# Patient Record
Sex: MALE | Race: ASIAN | NOT HISPANIC OR LATINO | Employment: FULL TIME | URBAN - METROPOLITAN AREA
[De-identification: names, ages, dates, MRNs, and addresses within clinical notes are randomized per-mention and may not be internally consistent; named-entity substitution may affect disease eponyms.]

---

## 2018-08-13 ENCOUNTER — OFFICE VISIT (OUTPATIENT)
Dept: URGENT CARE | Facility: URGENT CARE | Age: 23
End: 2018-08-13
Payer: COMMERCIAL

## 2018-08-13 VITALS
HEART RATE: 105 BPM | WEIGHT: 130.6 LBS | DIASTOLIC BLOOD PRESSURE: 81 MMHG | OXYGEN SATURATION: 98 % | TEMPERATURE: 99.1 F | SYSTOLIC BLOOD PRESSURE: 121 MMHG

## 2018-08-13 DIAGNOSIS — B02.9 HERPES ZOSTER WITHOUT COMPLICATION: Primary | ICD-10-CM

## 2018-08-13 PROCEDURE — 99213 OFFICE O/P EST LOW 20 MIN: CPT | Performed by: PEDIATRICS

## 2018-08-13 RX ORDER — VALACYCLOVIR HYDROCHLORIDE 1 G/1
1000 TABLET, FILM COATED ORAL 3 TIMES DAILY
Qty: 21 TABLET | Refills: 0 | Status: SHIPPED | OUTPATIENT
Start: 2018-08-13 | End: 2021-07-21

## 2018-08-13 NOTE — PROGRESS NOTES
SUBJECTIVE:   Chintan Harley is a 23 year old male who presents to clinic today because of:    Chief Complaint   Patient presents with     Mass     Patient complains of lump on neck x 4 days. Also complains of rash on shoulders and mid chest area        HPI  Concerns: lump on neck    Lump on right neck x 4 days, not increasing in size.  Mildly tender to palpation.     RASH    Problem started: 2 days ago  Location: started on right neck, spreading to chest  Description: red, raised, burning     Itching (Pruritis): no  Recent illness or sore throat in last week: YES- subjective fevers  Therapies Tried: None  New exposures: None  Recent travel: no    Mild headache, ROS otherwise negative.  Denies eye pain or changes in vision.       ROS  Constitutional, eye, ENT, skin, respiratory, cardiac, and GI are normal except as otherwise noted.    PROBLEM LIST  There are no active problems to display for this patient.     MEDICATIONS  Current Outpatient Prescriptions   Medication Sig Dispense Refill     valACYclovir (VALTREX) 1000 mg tablet Take 1 tablet (1,000 mg) by mouth 3 times daily for 7 days 21 tablet 0      ALLERGIES  No Known Allergies    Reviewed and updated as needed this visit by clinical staff  Tobacco  Allergies  Meds  Problems         Reviewed and updated as needed this visit by Provider  Allergies  Meds  Problems       OBJECTIVE:     /81 (BP Location: Left arm, Patient Position: Chair, Cuff Size: Adult Regular)  Pulse 105  Temp 99.1  F (37.3  C) (Oral)  Wt 130 lb 9.6 oz (59.2 kg)  SpO2 98%  Normalized stature-for-age data not available for patients older than 20 years.  Normalized weight-for-age data not available for patients older than 20 years.  Normalized BMI data available only for age 0 to 20 years.  Normalized stature-for-age data not available for patients older than 20 years.    GENERAL: Active, alert, in no acute distress.  SKIN: discrete scattered clusters of erythematous  papules, some with central scabbing, on center and R chest and neck in C4/C5 distribution  HEAD: Normocephalic.  EYES:  No discharge or erythema. Normal pupils and EOM.  EARS: Normal canals. Tympanic membranes are normal; gray and translucent.  NOSE: Normal without discharge.  MOUTH/THROAT: Clear. No oral lesions. Teeth intact without obvious abnormalities.  NECK: Supple, no masses.  LYMPH NODES: posterior cervical: 1 cm mobile node on R, no fluctuance or erythema  LUNGS: Clear. No rales, rhonchi, wheezing or retractions  HEART: regular rate and rhythm, normal pulses and grade 2/6 mid-systolic vibratory murmur at the left sternal border and mid left chest (innocent vibratory murmur), resolves when supine  ABDOMEN: Soft, non-tender, not distended, no masses or hepatosplenomegaly. Bowel sounds normal.     DIAGNOSTICS: None    ASSESSMENT/PLAN:   1. Herpes zoster without complication  Education and supportive cares discussed  Contagiousness discussed- patient has an 8 month old relative at home, recommend avoiding contact with him until the sores have scabbed over  - valACYclovir (VALTREX) 1000 mg tablet; Take 1 tablet (1,000 mg) by mouth 3 times daily for 7 days  Dispense: 21 tablet; Refill: 0  -side effects discussed      FOLLOW UP: If not improving or if worsening  See patient instructions    Viola Mauro MD

## 2018-08-13 NOTE — MR AVS SNAPSHOT
After Visit Summary   8/13/2018    Chintan Harley    MRN: [id number]           Patient Information     Date Of Birth          1995        Visit Information        Provider Department      8/13/2018 5:50 PM Viola Mauro MD [facility]        Today's Diagnoses     Herpes zoster without complication    -  1      Care Instructions      Shingles  Shingles is a viral infection caused by the same virus as chicken pox. Anyone who has had chicken pox may get shingles later in life. The virus stays in the body, but remains dormant (asleep). Shingles often occurs in older persons or persons with lowered immunity. But it can affect anyone at any age.  Shingles starts as a tingling patch of skin on one side of the body. Small, painful blisters may then appear. The rash does not spread to the rest of the body.  Exposure to shingles cannot cause shingles. However, it can cause chicken pox in anyone who has not had chicken pox or has not been vaccinated. The contagious period ends when all blisters have crusted over (generally about 2 weeks after the illness begins).  After the blisters heal, the affected skin may be sensitive or painful for months (neuralgia). This often gradually goes away.  A shingles vaccine is available. This can help prevent shingles or make it less painful. It is generally recommended for adults over the age of 60 who have had chicken pox in the past, but who have never had shingles. Adults over 60 who have had neither chicken pox nor shingles can prevent both diseases with the chicken pox vaccine. Ask your healthcare provider about these vaccines.  Home care    Medicines may be prescribed to help relieve pain. Take these medicines as directed. Ask your healthcare provider or pharmacist before using over-the-counter medicines for helping treat pain and itching.    In certain cases, antiviral medicines may be prescribed to reduce pain, shorten the  illness, and prevent neuralgia. Take these medicines as directed.    Compresses made from a solution of cool water mixed with cornstarch or baking soda may help relieve pain and itching.     Gently wash skin daily with soap and water to help prevent infection.  Be certain to rinse off all of the soap, which can be irritating.    Trim fingernails and try not to scratch. Scratching the sores may leave scars.    Stay home from work or school until all blisters have formed a crust and you are no longer contagious.  Follow-up care  Follow up with your healthcare provider or as directed by our staff.  When to seek medical advice    Fever of 100.4 F (38 C) or higher, or as directed by your healthcare provider    Affected skin is on the face or neck and any of the following occur:  ? Headache  ? Eye pain  ? Changes in vision  ? Sores near the eye  ? Weakness of facial muscles    Pain, redness, or swelling of a joint    Signs of skin infection: colored drainage from the sores, warmth, increasing redness, or increasing pain  Date Last Reviewed: 9/25/2015 2000-2017 [employer]. [address] [place] [address]. All rights reserved. This information is not intended as a substitute for professional medical care. Always follow your healthcare professional's instructions.                Follow-ups after your visit        Follow-up notes from your care team     Return if symptoms worsen or fail to improve in 2-3 days.      Who to contact     If you have questions or need follow up information about today's clinic visit or your schedule please contact [facility] directly at [phone].  Normal or non-critical lab and imaging results will be communicated to you by MyChart, letter or phone within 4 business days after the clinic has received the results. If you do not hear from us within 7 days, please contact the clinic through MyChart or phone. If you have a critical or abnormal lab  "result, we will notify you by phone as soon as possible.  Submit refill requests through [employer] or call your pharmacy and they will forward the refill request to us. Please allow 3 business days for your refill to be completed.          Additional Information About Your Visit        [employer] Information     [employer] lets you send messages to your doctor, view your test results, renew your prescriptions, schedule appointments and more. To sign up, go to [email or web address] . Click on \"Log in\" on the left side of the screen, which will take you to the Welcome page. Then click on \"Sign up Now\" on the right side of the page.     You will be asked to enter the access code listed below, as well as some personal information. Please follow the directions to create your username and password.     Your access code is: [email or web address]  Expires: 2018  6:25 PM     Your access code will  in 90 days. If you need help or a new code, please call your [facility] clinic or [phone].        Care EveryWhere ID     This is your Care EveryWhere ID. This could be used by other organizations to access your [facility] medical records  [id number]        Your Vitals Were     Pulse Temperature Pulse Oximetry             105 99.1  F (37.3  C) (Oral) 98%          Blood Pressure from Last 3 Encounters:   18 121/81   12 121/69    Weight from Last 3 Encounters:   18 130 lb 9.6 oz (59.2 kg)   12 114 lb 6.4 oz (51.9 kg) (19 %)*     * Growth percentiles are based on [facility][note 3] 2-20 Years data.              Today, you had the following     No orders found for display         Today's Medication Changes          These changes are accurate as of 18  7:31 PM.  If you have any questions, ask your nurse or doctor.               Start taking these medicines.        Dose/Directions    valACYclovir 1000 mg tablet   Commonly known as:  VALTREX   Used for:  Herpes zoster without complication   Started by:  Viola Mauro " MD Anisa        Dose:  1000 mg   Take 1 tablet (1,000 mg) by mouth 3 times daily for 7 days   Quantity:  21 tablet   Refills:  0            Where to get your medicines      These medications were sent to [id number] - [place], [place] - [address] [facility] [address], [place] [address]     Phone:  [phone]     valACYclovir 1000 mg tablet                Primary Care Provider Fax #    Provider Not In System [phone]                Equal Access to Services     [facility]: Hadii aad ku hadasho Soomaali, waaxda luqadaha, qaybta kaalmada adeegyada, waxjeanne price . So [facility] [phone].    ATENCIÓN: Si habla español, tiene a enriquez disposición servicios gratuitos de asistencia lingüística. [facility] [phone].    We comply with applicable federal civil rights laws and [place] laws. We do not discriminate on the basis of race, color, national origin, age, disability, sex, sexual orientation, or gender identity.            Thank you!     Thank you for choosing [facility]  for your care. Our goal is always to provide you with excellent care. Hearing back from our patients is one way we can continue to improve our services. Please take a few minutes to complete the written survey that you may receive in the mail after your visit with us. Thank you!             Your Updated Medication List - Protect others around you: Learn how to safely use, store and throw away your medicines at www.disposemymeds.org.          This list is accurate as of 8/13/18  7:31 PM.  Always use your most recent med list.                   Brand Name Dispense Instructions for use Diagnosis    valACYclovir 1000 mg tablet    VALTREX    21 tablet    Take 1 tablet (1,000 mg) by mouth 3 times daily for 7 days    Herpes zoster without complication

## 2018-08-13 NOTE — PATIENT INSTRUCTIONS
Shingles  Shingles is a viral infection caused by the same virus as chicken pox. Anyone who has had chicken pox may get shingles later in life. The virus stays in the body, but remains dormant (asleep). Shingles often occurs in older persons or persons with lowered immunity. But it can affect anyone at any age.  Shingles starts as a tingling patch of skin on one side of the body. Small, painful blisters may then appear. The rash does not spread to the rest of the body.  Exposure to shingles cannot cause shingles. However, it can cause chicken pox in anyone who has not had chicken pox or has not been vaccinated. The contagious period ends when all blisters have crusted over (generally about 2 weeks after the illness begins).  After the blisters heal, the affected skin may be sensitive or painful for months (neuralgia). This often gradually goes away.  A shingles vaccine is available. This can help prevent shingles or make it less painful. It is generally recommended for adults over the age of 60 who have had chicken pox in the past, but who have never had shingles. Adults over 60 who have had neither chicken pox nor shingles can prevent both diseases with the chicken pox vaccine. Ask your healthcare provider about these vaccines.  Home care    Medicines may be prescribed to help relieve pain. Take these medicines as directed. Ask your healthcare provider or pharmacist before using over-the-counter medicines for helping treat pain and itching.    In certain cases, antiviral medicines may be prescribed to reduce pain, shorten the illness, and prevent neuralgia. Take these medicines as directed.    Compresses made from a solution of cool water mixed with cornstarch or baking soda may help relieve pain and itching.     Gently wash skin daily with soap and water to help prevent infection.  Be certain to rinse off all of the soap, which can be irritating.    Trim fingernails and try not to scratch. Scratching the sores  may leave scars.    Stay home from work or school until all blisters have formed a crust and you are no longer contagious.  Follow-up care  Follow up with your healthcare provider or as directed by our staff.  When to seek medical advice    Fever of 100.4 F (38 C) or higher, or as directed by your healthcare provider    Affected skin is on the face or neck and any of the following occur:  ? Headache  ? Eye pain  ? Changes in vision  ? Sores near the eye  ? Weakness of facial muscles    Pain, redness, or swelling of a joint    Signs of skin infection: colored drainage from the sores, warmth, increasing redness, or increasing pain  Date Last Reviewed: 9/25/2015 2000-2017 [employer]. [address] [place] [address], [place] [address]. All rights reserved. This information is not intended as a substitute for professional medical care. Always follow your healthcare professional's instructions.

## 2021-07-01 ASSESSMENT — ENCOUNTER SYMPTOMS
HEMATURIA: 0
CONSTIPATION: 0
COUGH: 0
DIARRHEA: 0
DYSURIA: 0
DIZZINESS: 0
JOINT SWELLING: 0
EYE PAIN: 0
HEADACHES: 0
WEAKNESS: 0
SHORTNESS OF BREATH: 0
NERVOUS/ANXIOUS: 0
ABDOMINAL PAIN: 0
PALPITATIONS: 0
CHILLS: 0
FEVER: 0
HEMATOCHEZIA: 0
NAUSEA: 0
ARTHRALGIAS: 0
FREQUENCY: 0
PARESTHESIAS: 0
HEARTBURN: 0
SORE THROAT: 0
MYALGIAS: 0

## 2021-07-05 ENCOUNTER — OFFICE VISIT (OUTPATIENT)
Dept: FAMILY MEDICINE | Facility: CLINIC | Age: 26
End: 2021-07-05
Payer: COMMERCIAL

## 2021-07-05 VITALS
TEMPERATURE: 97.6 F | HEIGHT: 67 IN | BODY MASS INDEX: 22.85 KG/M2 | HEART RATE: 63 BPM | WEIGHT: 145.6 LBS | SYSTOLIC BLOOD PRESSURE: 125 MMHG | OXYGEN SATURATION: 100 % | DIASTOLIC BLOOD PRESSURE: 79 MMHG

## 2021-07-05 DIAGNOSIS — B35.1 ONYCHOMYCOSIS: ICD-10-CM

## 2021-07-05 DIAGNOSIS — Z30.09 BIRTH CONTROL COUNSELING: ICD-10-CM

## 2021-07-05 DIAGNOSIS — Z23 NEED FOR VACCINATION: ICD-10-CM

## 2021-07-05 DIAGNOSIS — L70.9 ACNE, UNSPECIFIED ACNE TYPE: ICD-10-CM

## 2021-07-05 DIAGNOSIS — Z00.00 ROUTINE GENERAL MEDICAL EXAMINATION AT A HEALTH CARE FACILITY: Primary | ICD-10-CM

## 2021-07-05 PROCEDURE — 99213 OFFICE O/P EST LOW 20 MIN: CPT | Mod: 25 | Performed by: PHYSICIAN ASSISTANT

## 2021-07-05 PROCEDURE — 90471 IMMUNIZATION ADMIN: CPT | Performed by: PHYSICIAN ASSISTANT

## 2021-07-05 PROCEDURE — 90715 TDAP VACCINE 7 YRS/> IM: CPT | Performed by: PHYSICIAN ASSISTANT

## 2021-07-05 PROCEDURE — 90651 9VHPV VACCINE 2/3 DOSE IM: CPT | Performed by: PHYSICIAN ASSISTANT

## 2021-07-05 PROCEDURE — 99385 PREV VISIT NEW AGE 18-39: CPT | Mod: 25 | Performed by: PHYSICIAN ASSISTANT

## 2021-07-05 PROCEDURE — 90472 IMMUNIZATION ADMIN EACH ADD: CPT | Performed by: PHYSICIAN ASSISTANT

## 2021-07-05 RX ORDER — SULFAMETHOXAZOLE AND TRIMETHOPRIM 400; 80 MG/1; MG/1
1 TABLET ORAL DAILY
Qty: 30 TABLET | Refills: 0 | Status: SHIPPED | OUTPATIENT
Start: 2021-07-05 | End: 2021-08-04

## 2021-07-05 SDOH — HEALTH STABILITY: MENTAL HEALTH: HOW MANY STANDARD DRINKS CONTAINING ALCOHOL DO YOU HAVE ON A TYPICAL DAY?: 1 OR 2

## 2021-07-05 SDOH — HEALTH STABILITY: MENTAL HEALTH: HOW OFTEN DO YOU HAVE 6 OR MORE DRINKS ON ONE OCCASION?: NEVER

## 2021-07-05 SDOH — HEALTH STABILITY: MENTAL HEALTH: HOW OFTEN DO YOU HAVE A DRINK CONTAINING ALCOHOL?: 2-3 TIMES A WEEK

## 2021-07-05 ASSESSMENT — PAIN SCALES - GENERAL: PAINLEVEL: NO PAIN (0)

## 2021-07-05 ASSESSMENT — MIFFLIN-ST. JEOR: SCORE: 1604.07

## 2021-07-05 NOTE — PATIENT INSTRUCTIONS
At [facility], we strive to deliver an exceptional experience to you, every time we see you. If you receive a survey, please complete it as we do value your feedback.  If you have MyChart, you can expect to receive results automatically within 24 hours of their completion.  Your provider will send a note interpreting your results as well.   If you do not have MyChart, you should receive your results in about a week by mail.    Your care team:                            Family Medicine Internal Medicine   MD Silvestre Pickard MD Shantel Branch-Fleming, MD Srinivasa Vaka, MD Katya Belousova, PALornaC  Aure Stephens, APRN CNP    El Ca, MD Pediatrics   Thee Morales, PABELL Knapp, CNP MD Xiomara Bella APRN CNP   MD Viola Pineda MD Deborah Mielke, MD Daisy Camp, APRN [facility]      Clinic hours: Monday - Thursday 7 am-6 pm; Fridays 7 am-5 pm.   Urgent care: Monday - Friday 10 am- 8 pm; Saturday and Sunday 9 am-5 pm.    Clinic: [phone]       [facility]: Monday - Thursday 8 am - 7 pm; Friday 8 am - 6 pm  [facility]: [phone]     Use www.[email or web address].org for 24/7 diagnosis and treatment of dozens of conditions.    Patient Education     Birth Control Methods  Birth control methods are used to help prevent pregnancy. There are many different methods to choose from. Talk with your healthcare provider about which method is right for you. Be sure to ask your provider how well each one works. Also ask about the benefits, risks, and side effects of each method.   Hormones  Some birth control methods work by releasing hormones such as progestin and estrogen. These methods include hormone implants, hormone shots, the vaginal ring, the patch, and birth control pills. They all work by stopping the release of the egg from the ovary (ovulation). All of these methods work well and  can be stopped at any time.     The implant is a small device that needs to be placed in the upper arm by a trained healthcare provider. It works for up to 3 years.    Hormone injections must be repeated every 3 months.    The vaginal ring must be replaced monthly. It can be removed during the fourth week of each cycle.    The patch must be replaced weekly. It's not worn during the fourth week of each cycle.    Birth control pills must be taken every day.  Intrauterine device (IUD)  An IUD is a small, T-shaped device. It must be placed in the uterus by a trained healthcare provider. There are different types of IUDs available. They work by causing changes in the uterus that make it harder for sperm to reach the egg. Depending on the type of IUD you have, it may work for several years or longer. The IUD is a reversible birth control method. This means it can be removed at any time.   Condom  A condom is a sheath that forms a thin barrier between the penis and the vagina. It helps prevent pregnancy by keeping sperm from entering the vagina. When latex condoms are used, they have the added benefit of protecting against most STIs (sexually transmitted infections). Condoms are used each time there is sexual intercourse and should be discarded after each use. Ask your healthcare provider about the different types of condoms available. These include both the male condom and female condom.   Spermicide  Spermicides come as foams, jellies, creams, suppositories, and tablets.  They help prevent pregnancy by killing sperm. When used alone they are not that reliable. They work best when combined with other birth control methods such as diaphragms and cervical caps.   Sponge, diaphragm, and cervical cap   All of these methods help prevent pregnancy by covering the opening of the uterus (cervix). This prevents sperm from passing through.   The sponge contains spermicide. It can be bought over the counter. The sponge must be left  in place for at least 6 hours after the last time you have sex. However, it should not stay in place for more than 24 hours. Discard after use.   The diaphragm and cervical cap must be fitted and prescribed by your healthcare provider. Both are used with spermicide. The diaphragm must be left in place for at least 6 hours after sex. However, it should not stay in place for more than 24 hours. It can be washed and reused. The cervical cap must be left in place for at least 6 hours after sex. However, it should not stay in place for more than 48 hours. It can be washed and reused.   Withdrawal method  This is when the man pulls his penis out of the vagina just before ejaculation ( coming ). This lowers the amount of sperm entering the vagina. Be aware that fluids released just before ejaculation often still contain some sperm, so this method is not as reliable as certain other methods.   Rhythm method   This method is also call natural family planning or fertility awareness. It requires that you know when in your menstrual cycle you are likely to become pregnant. Then you not have sex during those days. This requires careful planning and good discipline. Your healthcare provider can explain more about how this works.   Tubal ligation and vasectomy  These are surgical methods to prevent pregnancy. Tubal ligation is an option for women. The fallopian tubes are blocked or cut (ligated). This keeps the egg from passing into the uterus or sperm from reaching the egg. Vasectomy is an option for men. The tubes that normally carry sperm to the penis are either closed or blocked. Both tubal ligation and vasectomy are permanent birth control methods. This means reversal is either not possible or unlikely to work. They are good choices for women and men who know that they don't want to have children in the future.   [employer] last reviewed this educational content on 7/1/2020 2000-2021 [employer]. All rights  reserved. This information is not intended as a substitute for professional medical care. Always follow your healthcare professional's instructions.           Patient Education     Understanding Tinea Unguium  Tinea unguium is a common type of fungal infection. It is also called onychomycosis. The fungus infects the fingernails and, more commonly, the toenails. It s more common in men, older adults, and people who have diabetes, psoriasis, peripheral vascular disease, or another health problem that weakens the immune system.   How to say it  TIN--uh [id number]  What causes tinea unguium?  Tinea unguium is caused by a fungus. Several different types of fungus can grow on the nails.  The condition is much more likely to occur on the toenails. It can spread from one nail to another. You are more likely to get tinea unguium if you:    Have another fungal infection, such as athlete s foot    Have sweaty feet    Share nail clippers with a person who has a fungal infection    Swim often    Walk barefoot in damp areas, such as locker rooms    Use communal or shared showers    Wear artificial nails  What are the symptoms of tinea unguium?  If you have an infected nail, it may become:    Brittle    Thick    Hard    Discolored, yellow to brown    Irregular in shape  The nail may also have crumbling white or colored material under it.  If left untreated, the fungus may spread to the nail bed, which is the skin under the nail. The nail may  also fall off.  How is tinea unguium treated?  Tinea unguium is diagnosed by looking at nail clippings under a microscope. Medical treatment is not needed for all people. It is helpful for those who have cellulitis on their legs or feet that comes back again and again. It is also helpful for people who have diabetes plus risk factors for cellulitis, a weak immune system, nail pain, or concerns about how their toenails look.  With proper treatment, tinea unguium may be cured. But it often  takes several months as the nail grows. It is common for tinea unguium to return even after treatment.  Treatments include:    Good hygiene. Keep feet and nails clean and dry. If the infection is on the toenails, check that your shoes fit properly.    Medicine. Over-the-counter antifungal products, such as creams, generally don't cure the infection, but they may ease symptoms. If you have a severe infection, or the infection won t go away, you may need to use a prescription-strength antifungal antibiotic on the nail, or take an antifungal antibiotic by mouth. The oral antibiotic usually needs to be taken for 3 to 4 months for the most benefit. Some of these oral medicines can have side effects, but they are usually manageable .    Surgery.  The nail may be removed. But there is a high chance the fungus will return.    Laser. A special type of laser directed at the nail itself can kill the fungus  When should I call my healthcare provider?  Call your healthcare provider right away if you have any of these:    Pain that gets worse    Symptoms that don t get better, or get worse    New symptoms  [employer] last reviewed this educational content on 11/1/2019 2000-2021 [employer]. All rights reserved. This information is not intended as a substitute for professional medical care. Always follow your healthcare professional's instructions.           Patient Education     Controlling Adult Acne     Look for water-based, oil-free skin care products. These are less likely to clog your pores.   Your acne treatment will work best if you follow your treatment plan. Be patient. Acne often takes months to improve, not days or weeks. Ask your healthcare provider when you can expect your skin to look better. If you don t see results by your goal date, call your healthcare provider. He or she may want to give you some other type of treatment.  Using skin care products and cosmetics  Besides following your treatment  plan, take care in choosing skin care products and cosmetics. The following tips may help:    Choose gentle, oil-free soaps and facial cleansers.    Don't use harsh acne scrubs, cleansers, or astringents. They can irritate your skin and make acne worse.    Ask your healthcare provider before buying over-the-counter acne treatments. Some of these, such as those with benzoyl peroxide or salicylic acid, can work. But they often have side effects, such as skin getting too dry with treatments that are too strong.    Read labels on makeup and moisturizers. Choose those that are water based and oil free. Look for the term noncomedogenic. It means that the product won t clog your pores.  Caring for your skin  The right skin care routine can help keep your skin healthy. To take good care of your skin, try these tips:    Gently wash affected skin twice a day with a mild cleanser. Using your fingertips, smooth the cleanser over your skin. Rinse your skin well. Pat it dry.    If your healthcare provider has approved any over-the-counter acne medicine, use as directed. Use it after you wash your skin. Apply the medicine to all areas where you get acne. Don t just treat acne you can see now. New blemishes may be in progress but not in view yet. Treat all the skin.    Don t squeeze or pick blemishes. Acne blemishes may heal on their own. But squeezing can cause scarring. Scars will remain after acne blemishes heal.    Using sponges, brushes, or other abrasive tools to clean your skin can irritate the skin. Don't use them.    If you use soft sponges or cloths to apply makeup, keep them clean.    Try not to touch your face.    If possible, don't wear clothing or equipment that blocks or rubs the skin with acne.  Getting good results  Learning more about acne is the first step toward controlling this condition. Acne that s being treated often gets worse at first before it improves. But with proper treatment and skin care, you can  manage your acne and feel better about your skin.  [employer] last reviewed this educational content on 7/1/2020 2000-2021 [employer]. All rights reserved. This information is not intended as a substitute for professional medical care. Always follow your healthcare professional's instructions.

## 2021-07-05 NOTE — NURSING NOTE
Prior to immunization administration, verified patients identity using patient s name and date of birth. Please see Immunization Activity for additional information.     Screening Questionnaire for Adult Immunization    Are you sick today?   No   Do you have allergies to medications, food, a vaccine component or latex?   No   Have you ever had a serious reaction after receiving a vaccination?   No   Do you have a long-term health problem with heart, lung, kidney, or metabolic disease (e.g., diabetes), asthma, a blood disorder, no spleen, complement component deficiency, a cochlear implant, or a spinal fluid leak?  Are you on long-term aspirin therapy?   No   Do you have cancer, leukemia, HIV/AIDS, or any other immune system problem?   No   Do you have a parent, brother, or sister with an immune system problem?   No   In the past 3 months, have you taken medications that affect  your immune system, such as prednisone, other steroids, or anticancer drugs; drugs for the treatment of rheumatoid arthritis, Crohn s disease, or psoriasis; or have you had radiation treatments?   No   Have you had a seizure, or a brain or other nervous system problem?   No   During the past year, have you received a transfusion of blood or blood    products, or been given immune (gamma) globulin or antiviral drug?   No   For women: Are you pregnant or is there a chance you could become       pregnant during the next month?   No   Have you received any vaccinations in the past 4 weeks?   No     Immunization questionnaire answers were all negative.        Per orders of Dr. Morales, injection of Tdap (adacel), HPV9 given by Amanda Gallo MA. Patient instructed to remain in clinic for 15 minutes afterwards, and to report any adverse reaction to me immediately.       Screening performed by Amanda Gallo MA on 7/5/2021 at 10:16 AM.

## 2021-07-05 NOTE — PROGRESS NOTES
Answers for HPI/ROS submitted by the patient on 7/1/2021   Annual Exam:  Frequency of exercise:: 2-3 days/week  Getting at least 3 servings of Calcium per day:: Yes  Diet:: Other  Taking medications regularly:: Yes  Medication side effects:: None  Bi-annual eye exam:: NO  Dental care twice a year:: Yes  Sleep apnea or symptoms of sleep apnea:: None  abdominal pain: No  Blood in stool: No  Blood in urine: No  chest pain: No  chills: No  congestion: No  constipation: No  cough: No  diarrhea: No  dizziness: No  ear pain: No  eye pain: No  nervous/anxious: No  fever: No  frequency: No  genital sores: No  headaches: No  hearing loss: No  heartburn: No  arthralgias: No  joint swelling: No  peripheral edema: No  mood changes: No  myalgias: No  nausea: No  dysuria: No  palpitations: No  Skin sensation changes: No  sore throat: No  urgency: No  rash: No  shortness of breath: No  visual disturbance: No  weakness: No  impotence: No  penile discharge: No  Additional concerns today:: Yes  Duration of exercise:: 15-30 minutes    3  SUBJECTIVE:   CC: Chintan Harley is an 26 year old male who presents for preventive health visit.       Patient has been advised of split billing requirements and indicates understanding: Yes  Healthy Habits:see above     Rt toenails getting scaly, thick    Getting bad scalp acne, seslon blue didn' thelp, also didn't improve after stopping wearing hats.      Interested in vasectomy has 2 kids.  Want to get wife of mirena as causes side effects.      Today's PHQ-2 Score:   PHQ-2 ( 1999 Pfizer) 7/5/2021 7/1/2021   Q1: Little interest or pleasure in doing things 0 0   Q2: Feeling down, depressed or hopeless 0 0   PHQ-2 Score 0 0   Q1: Little interest or pleasure in doing things - Not at all   Q2: Feeling down, depressed or hopeless - Not at all   PHQ-2 Score - 0         Do you feel safe in your environment? Yes    Have you ever done Advance Care Planning? (For example, a Health Directive, POLST,  "or a discussion with a medical provider or your loved ones about your wishes): No, advance care planning information given to patient to review.  Patient plans to discuss their wishes with loved ones or provider.      Social History     Tobacco Use     Smoking status: Never Smoker     Smokeless tobacco: Never Used   Substance Use Topics     Alcohol use: Yes     Alcohol/week: 1.0 standard drinks     Types: 1 Cans of beer per week     Frequency: 2-3 times a week     Drinks per session: 1 or 2     Binge frequency: Never     If you drink alcohol do you typically have >3 drinks per day or >7 drinks per week? no    No flowsheet data found.                      Last PSA: No results found for: PSA    Reviewed orders with patient. Reviewed health maintenance and updated orders accordingly - Yes      Reviewed and updated as needed this visit by clinical staff  Tobacco                Reviewed and updated as needed this visit by Provider                 ROS:  see above    OBJECTIVE:   /79 (BP Location: Left arm, Patient Position: Sitting, Cuff Size: Adult Regular)   Pulse 63   Temp 97.6  F (36.4  C) (Tympanic)   Ht 1.702 m (5' 7\")   Wt 66 kg (145 lb 9.6 oz)   SpO2 100%   BMI 22.80 kg/m    EXAM:  GENERAL: healthy, alert and no distress  EYES: Eyes grossly normal to inspection, PERRL and conjunctivae and sclerae normal  HENT: ear canals and TM's normal, nose and mouth without ulcers or lesions  NECK: no adenopathy, no asymmetry, masses, or scars and thyroid normal to palpation  RESP: lungs clear to auscultation - no rales, rhonchi or wheezes  CV: regular rate and rhythm, normal S1 S2, no S3 or S4, no murmur, click or rub, no peripheral edema and peripheral pulses strong  ABDOMEN: soft, nontender, no hepatosplenomegaly, no masses and bowel sounds normal  MS: no gross musculoskeletal defects noted, no edema  SKIN: rt toe nails generally ,ild thickened at the distal edges  A few pustules scatterred on scalp in various " "stages of healing  NEURO: Normal strength and tone, mentation intact and speech normal  PSYCH: mentation appears normal, affect normal/bright       ASSESSMENT/PLAN:   Chintan was seen today for physical and code ma.    Diagnoses and all orders for this visit:    Routine general medical examination at a health care facility    Onychomycosis  -     Orthopedic  Referral; Future    Acne, unspecified acne type  -     sulfamethoxazole-trimethoprim (BACTRIM) 400-80 MG tablet; Take 1 tablet by mouth daily    Birth control counseling  -     Adult Urology Referral; Future    Need for vaccination  -     TDAP VACCINE (Adacel, Boostrix)  [[id number]]  -     HUMAN PAPILLOMA VIRUS (GARDASIL 9) VACCINE [[id number]]     URO- discussed may be too young for surgery.   He will like to discuss with sx.    As difficult to tx topically on scalp, will trial a month of daily low dose bactrim for recurrent pustules vs acne    Patient has been advised of split billing requirements and indicates understanding: Yes  COUNSELING:  Reviewed preventive health counseling, as reflected in patient instructions    Estimated body mass index is 22.8 kg/m  as calculated from the following:    Height as of this encounter: 1.702 m (5' 7\").    Weight as of this encounter: 66 kg (145 lb 9.6 oz).        He reports that he has never smoked. He has never used smokeless tobacco.      Counseling Resources:  ATP IV Guidelines  Pooled Cohorts Equation Calculator  FRAX Risk Assessment  ICSI Preventive Guidelines  Dietary Guidelines for Americans, 2010  USDA's MyPlate  ASA Prophylaxis  Lung CA Screening    ALLAN Wolfe  [facility]  "

## 2021-07-21 ENCOUNTER — OFFICE VISIT (OUTPATIENT)
Dept: PODIATRY | Facility: CLINIC | Age: 26
End: 2021-07-21
Attending: PHYSICIAN ASSISTANT
Payer: COMMERCIAL

## 2021-07-21 VITALS
HEART RATE: 100 BPM | DIASTOLIC BLOOD PRESSURE: 74 MMHG | SYSTOLIC BLOOD PRESSURE: 107 MMHG | WEIGHT: 145 LBS | BODY MASS INDEX: 22.71 KG/M2

## 2021-07-21 DIAGNOSIS — L84 CALLUS: Primary | ICD-10-CM

## 2021-07-21 DIAGNOSIS — B35.1 ONYCHOMYCOSIS: ICD-10-CM

## 2021-07-21 PROCEDURE — 99203 OFFICE O/P NEW LOW 30 MIN: CPT | Performed by: PODIATRIST

## 2021-07-21 RX ORDER — CICLOPIROX 80 MG/ML
1 SOLUTION TOPICAL DAILY
Qty: 6 ML | Refills: 4 | Status: SHIPPED | OUTPATIENT
Start: 2021-07-21 | End: 2022-02-10

## 2021-07-21 NOTE — PATIENT INSTRUCTIONS
We wish you continued good healing. If you have any questions or concerns, please do not hesitate to contact us at [phone]    [facility] [employer] (secure e-mail communication and access to your chart) to send a message or to make an appointment.    Please remember to call and schedule a follow up appointment if one was recommended at your earliest convenience.     +++OF MARCH 2020+++ LOCATION AND HOURS HAVE CHANGED    PLEASE CALL CLINICS TO VERIFY DAYS AND TIMES  PODIATRY CLINIC HOURS  TELEPHONE NUMBER    Dr. Hay RILEYPAARON [facility]        Clinics:  [facility]   Tuesday 1PM-6PM  [place]  Wednesday 745AM-330PM  [place]  Thursday/Friday 745AM-230PM  [place] [facility] APPOINTMENTS  [phone]    [place] APPOINTMENTS  [phone]          If you need a medication refill, please contact us you may need lab work and/or a follow up visit prior to your refill (i.e. Antifungal medications).    If MRI needed please call Imaging at [phone] or [phone]    HOW DO I GET MY KNEE SCOOTER? Knee scooters can be picked up at ANY Medical Supply stores with your knee scooter Prescription.  OR    Bring your signed prescription to an [facility] showroom.

## 2021-07-21 NOTE — LETTER
7/21/2021         RE: Chintan Harley  [address] [place] [address]        Dear Colleague,    Thank you for referring your patient, Chintan Harley, to the [facility]. Please see a copy of my visit note below.    Patient seen today in consult from Thee Morales and complains of thick right first and fourth toenails(s) .  Has had this for several years.  It is slowly getting worse.  Has had pain in the past which is aggravated by activity and relieved by rest.  Tried over the counter medications without success.  Does not like the look of the nail and is wondering about options.  Patient also complains of painful skin lesion plantar left foot.  It is gotten worse this summer.  He is mostly wearing an expensive flip-flops.    ROS:  A 10-point review of systems was performed and is positive for that noted in the HPI and as seen below.  All other areas are negative.        No Known Allergies    Current Outpatient Medications   Medication Sig Dispense Refill     ciclopirox (PENLAC) 8 % external solution Apply 1 applicator topically daily 6 mL 4     sulfamethoxazole-trimethoprim (BACTRIM) 400-80 MG tablet Take 1 tablet by mouth daily 30 tablet 0       There is no problem list on file for this patient.      No past medical history on file.    No past surgical history on file.    Family History   Problem Relation Age of Onset     Diabetes Maternal Grandmother      Asthma No family hx of      C.A.D. No family hx of      Cardiovascular No family hx of      Hypertension No family hx of      Cerebrovascular Disease No family hx of      Breast Cancer No family hx of      Cancer - colorectal No family hx of      Prostate Cancer No family hx of        Social History     Tobacco Use     Smoking status: Never Smoker     Smokeless tobacco: Never Used   Substance Use Topics     Alcohol use: Yes     Alcohol/week: 1.0 standard drinks     Types: 1 Cans of beer per week         /74    Pulse 100   Wt 65.8 kg (145 lb)   BMI 22.71 kg/m  .      VConstitutional/ general:  Pt is in no apparent distress, appears well-nourished.  Cooperative with history and physical exam.     Psych:  The patient answered questions appropriately.  Normal affect.  Seems to have reasonable expectations, in terms of treatment.    Eyes:  Visual scanning/ tracking without deficit.    Ears:  Response to auditory stimuli is normal.  negative hearing aid devices.  Auricles in proper alignment.     Lymphatic:  Popliteal lymph nodes not enlarged.     Lungs:  Non labored breathing, non labored speech. No cough.  No audible wheezing. Even, quiet breathing.       Vascular:  Pedal pulses are palpable bilaterally for both the DP and PT arteries.  CFT < 3 sec.  No edema.  Pedal hair growth noted.     Neuro:  Alert and oriented x 3. Coordinated gait.  Light touch sensation is intact to the L4, L5, S1 distributions. No obvious deficits.  No evidence of neurological-based weakness, spasticity, or contracture in the lower extremities.    Derm: Normal texture and turgor.  No erythema, ecchymosis, or cyanosis.  No open lesions. right first and fourth toenails(s)  thickened, elongated, discolored, with subungual debris.  No erythema, edema, drainage, pain on palpation.  No signs of subungual masses or exostosis.  Small callus left subsecond metatarsal head    Musculoskeletal:    Lower extremity muscle strength is normal.  Patient is ambulatory without an assistive device or brace.  No gross deformities.  MS 5/5 all compartments.  Normal arch with weightbearing.         A/P  Onychomycosis          Callus     Discussed cause of thick nails.  Discussed all options with patient.  [facility] risks, complications, and efficacy of going on this discussed.  Instructed him on how to use this.  We gave him several refills.  Discussed cause of callus.  He will try to wear a stiffer shoe and I made suggestions.  He will use pumice stone.   RTC as needed.  Thank you for allowing me to participate in the care of this patient    Hay Rm DPM, FACFAS                      Again, thank you for allowing me to participate in the care of your patient.        Sincerely,        Hay Rm DPM

## 2021-07-22 NOTE — PROGRESS NOTES
Patient seen today in consult from Thee Morales and complains of thick right first and fourth toenails(s) .  Has had this for several years.  It is slowly getting worse.  Has had pain in the past which is aggravated by activity and relieved by rest.  Tried over the counter medications without success.  Does not like the look of the nail and is wondering about options.  Patient also complains of painful skin lesion plantar left foot.  It is gotten worse this summer.  He is mostly wearing an expensive flip-flops.    ROS:  A 10-point review of systems was performed and is positive for that noted in the HPI and as seen below.  All other areas are negative.        No Known Allergies    Current Outpatient Medications   Medication Sig Dispense Refill     ciclopirox (PENLAC) 8 % external solution Apply 1 applicator topically daily 6 mL 4     sulfamethoxazole-trimethoprim (BACTRIM) 400-80 MG tablet Take 1 tablet by mouth daily 30 tablet 0       There is no problem list on file for this patient.      No past medical history on file.    No past surgical history on file.    Family History   Problem Relation Age of Onset     Diabetes Maternal Grandmother      Asthma No family hx of      C.A.D. No family hx of      Cardiovascular No family hx of      Hypertension No family hx of      Cerebrovascular Disease No family hx of      Breast Cancer No family hx of      Cancer - colorectal No family hx of      Prostate Cancer No family hx of        Social History     Tobacco Use     Smoking status: Never Smoker     Smokeless tobacco: Never Used   Substance Use Topics     Alcohol use: Yes     Alcohol/week: 1.0 standard drinks     Types: 1 Cans of beer per week         /74   Pulse 100   Wt 65.8 kg (145 lb)   BMI 22.71 kg/m  .      VConstitutional/ general:  Pt is in no apparent distress, appears well-nourished.  Cooperative with history and physical exam.     Psych:  The patient answered questions appropriately.  Normal affect.   Seems to have reasonable expectations, in terms of treatment.    Eyes:  Visual scanning/ tracking without deficit.    Ears:  Response to auditory stimuli is normal.  negative hearing aid devices.  Auricles in proper alignment.     Lymphatic:  Popliteal lymph nodes not enlarged.     Lungs:  Non labored breathing, non labored speech. No cough.  No audible wheezing. Even, quiet breathing.       Vascular:  Pedal pulses are palpable bilaterally for both the DP and PT arteries.  CFT < 3 sec.  No edema.  Pedal hair growth noted.     Neuro:  Alert and oriented x 3. Coordinated gait.  Light touch sensation is intact to the L4, L5, S1 distributions. No obvious deficits.  No evidence of neurological-based weakness, spasticity, or contracture in the lower extremities.    Derm: Normal texture and turgor.  No erythema, ecchymosis, or cyanosis.  No open lesions. right first and fourth toenails(s)  thickened, elongated, discolored, with subungual debris.  No erythema, edema, drainage, pain on palpation.  No signs of subungual masses or exostosis.  Small callus left subsecond metatarsal head    Musculoskeletal:    Lower extremity muscle strength is normal.  Patient is ambulatory without an assistive device or brace.  No gross deformities.  MS 5/5 all compartments.  Normal arch with weightbearing.         A/P  Onychomycosis          Callus     Discussed cause of thick nails.  Discussed all options with patient.  Would like to try [facility] risks, complications, and efficacy of going on this discussed.  Instructed him on how to use this.  We gave him several refills.  Discussed cause of callus.  He will try to wear a stiffer shoe and I made suggestions.  He will use pumice stone.  RTC as needed.  Thank you for allowing me to participate in the care of this patient    Hay Rm, JOSE, FACBENJIE

## 2021-08-24 ENCOUNTER — TELEPHONE (OUTPATIENT)
Dept: UROLOGY | Facility: CLINIC | Age: 26
End: 2021-08-24

## 2021-08-24 ENCOUNTER — VIRTUAL VISIT (OUTPATIENT)
Dept: UROLOGY | Facility: CLINIC | Age: 26
End: 2021-08-24
Attending: UROLOGY
Payer: COMMERCIAL

## 2021-08-24 DIAGNOSIS — Z30.09 BIRTH CONTROL COUNSELING: ICD-10-CM

## 2021-08-24 PROCEDURE — 99202 OFFICE O/P NEW SF 15 MIN: CPT | Mod: 95 | Performed by: UROLOGY

## 2021-08-24 NOTE — LETTER
Mr.Alexander VICK Manpreetshanta  [address]  [facility] [address]          September 3, 2021    Dear ,    We have attempted to reach you by phone for a follow up discussion from your question. At your convenience, please call the [facility] at [phone] to discuss further.      Thank you,      Dr. Segura's Office

## 2021-08-24 NOTE — TELEPHONE ENCOUNTER
Received note below per Dr. Segura.    Anil Segura MD  You; [facility] [place] 2 minutes ago (3:54 PM)   KAZ Lowe,    .  If he wants to do that it would have to be before his vasectomy, but if he is interested in having more biologic children then he should just wait to schedule a vasectomy until he is sure.     Thanks,   Anil Segura M.D.          Left generic voicemail for patient to return call to clinic. When call is returned, will discuss note above.    Chrissy Braga LPN on 8/24/2021 at 3:57 PM

## 2021-08-24 NOTE — PROGRESS NOTES
VASECTOMY CONSULTATION NOTE  DATE OF VISIT: 8/24/2021  [facility]   PATIENT NAME: Chintan Harley    YOB: 1995      REASON FOR CONSULTATION: MrTheresa Harley is a 26 year old year old gentleman who is seen today requesting a vasectomy. He has 2 children - 5 year old boy and 3 year old boy - and he wishes to have a vasectomy for birth control. His wife is in agreement with this plan.     Works 2 programing jobs    PAST MEDICAL HISTORY: No past medical history on file.    PAST SURGICAL HISTORY: No past surgical history on file.    MEDICATIONS:   Current Outpatient Medications:      ciclopirox (PENLAC) 8 % external solution, Apply 1 applicator topically daily (Patient not taking: Reported on 8/24/2021), Disp: 6 mL, Rfl: 4    ALLERGIES: No Known Allergies    FAMILY HISTORY:   Family History   Problem Relation Age of Onset     Diabetes Maternal Grandmother      Asthma No family hx of      C.A.D. No family hx of      Cardiovascular No family hx of      Hypertension No family hx of      Cerebrovascular Disease No family hx of      Breast Cancer No family hx of      Cancer - colorectal No family hx of      Prostate Cancer No family hx of        SOCIAL HISTORY:   Social History     Socioeconomic History     Marital status:      Spouse name: Not on file     Number of children: Not on file     Years of education: Not on file     Highest education level: Not on file   Occupational History     Not on file   Tobacco Use     Smoking status: Never Smoker     Smokeless tobacco: Never Used   Substance and Sexual Activity     Alcohol use: Yes     Alcohol/week: 1.0 standard drinks     Types: 1 Cans of beer per week     Drug use: No     Sexual activity: Never   Other Topics Concern     Not on file   Social History Narrative     Not on file     Social Determinants of Health     Financial Resource Strain:      Difficulty of Paying Living Expenses:    Food Insecurity:      Worried About Running Out of  Food in the Last Year:      Ran Out of Food in the Last Year:    Transportation Needs:      Lack of Transportation (Medical):      Lack of Transportation (Non-Medical):    Physical Activity:      Days of Exercise per Week:      Minutes of Exercise per Session:    Stress:      Feeling of Stress :    Social Connections:      Frequency of Communication with Friends and Family:      Frequency of Social Gatherings with Friends and Family:      Attends Anabaptist Services:      Active Member of Clubs or Organizations:      Attends Club or Organization Meetings:      Marital Status:    Intimate Partner Violence:      Fear of Current or Ex-Partner:      Emotionally Abused:      Physically Abused:      Sexually Abused:        PHYSICAL EXAM  Patient is a 26 year old  male   Vitals: There were no vitals taken for this visit.  There is no height or weight on file to calculate BMI.  General Appearance Adult:   Alert, no acute distress, oriented  HENT: throat/mouth:normal, good dentition  Lungs: no respiratory distress, or pursed lip breathing  Heart: No obvious jugular venous distension present  Abdomen: non - distended  Musculoskeltal: extremities normal, no peripheral edema  Skin: no suspicious lesions or rashes  Neuro: Alert, oriented, speech and mentation normal  Psych: affect and mood normal  Gait: Normal     DIAGNOSIS: Request for sterilization    PLAN: The risks of the procedure as well as expectations for recovery and outcomes were explained in detail to him.  He was counseled on the risks for bleeding infection and pain after the procedure. We discussed the risk of post-vasectomy pain syndrome.  He was instructed to continue to use contraception until he had proven azoospermia on a semen specimen.  This would normally be collected at least 3 months after the procedure. Also discussed the rare, but possible risk of re-canalization of the vas, even after successful vasectomy with sterile semen specimen.  He was instructed  to hold all anticoagulants medications for one week prior to the procedure.  It was recommended that he have someone else drive him home after his vasectomy.  In light of these risks and expectations he would like to proceed.  We are scheduling a vasectomy in the office in the near future.    Pt. Understands:  -1/1000-1/3000 risk of future pregnancy even with perfectly done vasectomy  -vasectomy is a permanent procedure    -he may cryopreserve sperm if he wishes   -1-5% risk of post-vasectomy pain syndrome   -1-5% risk of complication, primarily infection or bleeding  - he needs to have a semen sample that shows no sperm before getting approval for unprotected intercourse.      Thank you for the kind consultation.    Time spent: 10 minutes of which >50% was spent counseling.    Anil Segura MD   Urology  [facility]  Clinic Phone [phone]      Chintan Harley  who is being evaluated via a billable video visit.      How would you like to obtain your AVS? MyChart  If the video visit is dropped, the invitation should be resent by: Text to cell phone: [phone]  Will anyone else be joining your video visit? No    Video-Visit Details    Type of service:  Video Visit    Video Start Time: 8:15 AM    Video End Time:8:25 AM    Originating Location (pt. Location): Home    Distant Location (provider location):  [facility]     Platform used for Video Visit: Easiest Credit Card To Get Approved For

## 2021-08-24 NOTE — TELEPHONE ENCOUNTER
[facility] Call Center    Phone Message    May a detailed message be left on voicemail: yes     Reason for Call: The patient called and schedule his vascectomy for 11/16/21. He also had questions regarding cryopreservation. Does that happen before of at the time of vasectomy? He can be reached at the phone number on file or through [employer]. Please advise. Thank you.    Action Taken: Message routed to:  Adult Clinics: Urology p [phone]    Travel Screening: Not Applicable

## 2021-08-31 NOTE — TELEPHONE ENCOUNTER
Attempted to reach patient by phone, but no answer. Left generic message with request to return call to clinic. When patient returns call, will discuss note and recommendations below from Dr. Segura.    Suzy Lynn RN, BSN

## 2021-09-03 NOTE — TELEPHONE ENCOUNTER
No follow up call received from patient. Letter written and sent to patient's home address on file.    Suzy Lynn RN, BSN

## 2021-09-09 ENCOUNTER — NURSE TRIAGE (OUTPATIENT)
Dept: NURSING | Facility: CLINIC | Age: 26
End: 2021-09-09

## 2021-09-09 NOTE — TELEPHONE ENCOUNTER
RN Triage:    Chintan is returning a call from the clinic regarding vasectomy and cryopreservation.  Writer read note from Dr. Segura dated 8/24/21 regarding above to Chintan.  He had no other questions.    Kenyetta López RN 09/09/21 5:34 PM  [facility] Nurse Advisor

## 2021-09-19 ENCOUNTER — MYC MEDICAL ADVICE (OUTPATIENT)
Dept: FAMILY MEDICINE | Facility: CLINIC | Age: 26
End: 2021-09-19

## 2021-09-19 ENCOUNTER — MYC MEDICAL ADVICE (OUTPATIENT)
Dept: UROLOGY | Facility: CLINIC | Age: 26
End: 2021-09-19

## 2021-09-26 ENCOUNTER — HEALTH MAINTENANCE LETTER (OUTPATIENT)
Age: 26
End: 2021-09-26

## 2021-11-16 ENCOUNTER — OFFICE VISIT (OUTPATIENT)
Dept: UROLOGY | Facility: CLINIC | Age: 26
End: 2021-11-16
Payer: COMMERCIAL

## 2021-11-16 VITALS
DIASTOLIC BLOOD PRESSURE: 80 MMHG | RESPIRATION RATE: 18 BRPM | HEART RATE: 63 BPM | OXYGEN SATURATION: 100 % | SYSTOLIC BLOOD PRESSURE: 127 MMHG

## 2021-11-16 DIAGNOSIS — Z30.2 ENCOUNTER FOR STERILIZATION: Primary | ICD-10-CM

## 2021-11-16 PROCEDURE — 55250 REMOVAL OF SPERM DUCT(S): CPT | Performed by: UROLOGY

## 2021-11-16 ASSESSMENT — PAIN SCALES - GENERAL: PAINLEVEL: NO PAIN (0)

## 2021-11-16 NOTE — PROGRESS NOTES
OFF[facility]PERAT[facility]     DATE: 11/16/21  PATIENT: Chintan Harley    YOB: 1995    Chintan Harley is a 26 year old male.  He has 2 children and he wishes a vasectomy for birth control.  He has read the brochure and he has shaved himself.  I reviewed the vasectomy procedure with him explaining that it would be done with a local anesthetic given just in the location where the vasectomy would be done.  It would be done through incisions with the removal of segments of the vasa, cauterization of the ends, and burying the ends separate with sutures.      Pt. Understands:  -1/1000-1/3000 risk of future pregnancy even with perfectly done vasectomy  -vasectomy is a permanent procedure    -he may cryopreserve sperm if he wishes   -1-5% risk of post-vasectomy pain syndrome   -1-5% risk of complication, primarily infection or bleeding  - he needs to have a semen sample that shows no sperm before getting approval for unprotected intercourse.      Complications such as bleeding, infection, and damage to other tissues in the area were discussed. I recommended that an ice bag be placed on the scrotum off and on tonight to help reduce pain and swelling.      He was reminded that he was not sterile immediately after the vasectomy that it would take at least 20 ejaculations to empty the vas of any remaining sperm.  He was not to provide a semen sample until after the 20th ejaculation and not before 12 weeks after the vas. He was  to fulfill both of those requirements.   He understands it is his responsibility to find out the results of the vas before proceeding with intercourse without birth control protection.  Other items discussed were activity afterwards, returning to work, voluntary physical activity,  resuming sexual activity, clothing to wear, bathing, and care of the vas site and expected changes in the site as healing progresses.  After signing the permit, bilateral vasectomy  was done as described below.     ANESTHESIA: Local    DETAILS OF PROCEDURE: The risks of the procedure were explained in detail to the patient and informed consent was obtained. The patient was placed supine on the procedure table and the penis and scrotum were prepped and draped in the standard sterile fashion. The right vas deferens was isolated and brought up to the skin. 1% lidocaine local anesthesia was used to infiltrate the skin and the spermatic cord. A small incision was created and adventitial tissues were swept away from the vas. A 1 cm segment of the vas was excised and sent for pathology. The proximal and distal lumina of the vas were cauterized and then each segment was tied off in a knuckling-fashion with a 3-0 vicryl suture. Hemostasis was ensured and the segments were released back into the scrotum. Meticulous hemostasis was achieved. Next the left vas was brought to the skin and a vasectomy was performed in the similar fashion. At the end of the procedure a single 3-0 chromic suture was placed in the skin.     COMPLICATIONS: None    TAKE HOME MEDICATIONS: Tylenol every 6 hours, PRN    DISMISSAL INSTRUCTIONS:  - Ice pack to scrotum 15 to 20 minutes each hour awake for 36 to 40 hours.  - No strenuous activity or ejaculation for 14 days.  - No unprotected sexual activity until proven azoospermia on semen samples at 3 months.  - Referred to patient handout for normal postop expectations and indications to contact nurse or physician.    M.D.: Anil Segura MD

## 2021-11-16 NOTE — NURSING NOTE
Chintan Harley's goals for this visit include:   Chief Complaint   Patient presents with     Sterilization       He requests these members of his care team be copied on today's visit information: NONE    PCP: No Ref-Primary, Physician    Referring Provider:  No referring provider defined for this encounter.    /80   Pulse 63   Resp 18   SpO2 100%     Do you need any medication refills at today's visit? None    Suzy Lynn RN, BSN

## 2021-11-16 NOTE — PATIENT INSTRUCTIONS
Vasectomy Post-Op Care Instructions     You may go home after the procedure is completed. There may be some pain in your groin for 3 or 4 days after the operation. Some blood or yellow liquid may ooze from the cuts on the outside. The area around the cuts may swell and bruise. The sutures will dissolve on their own and do not require removal by the physician.    The first 48 hours after the procedure are crucial to healing. Generally, you may feel very good the day after the procedure, but that does not mean it is time to go back to normal activities. Resuming normal activities too soon is likely to cause internal bleeding and lots of pain.      Your provider may advise the following ways to care for yourself after the procedure:    Put an ice bag or package of frozen peas covered by a thin towel over the scrotum after the procedure. Leave the ice on the area for 30 minutes and then take it off for 30 minutes. Do this off and on for at least 24 hours.      Avoid all heavy lifting (greater than 10 pounds) for at least one week.    Wear a jockstrap or tight-fitting underwear for approximately one week to support the scrotum (testicles) and help reduce discomfort.    Take a pain reliever, such as Acetaminophen (Tylenol) or Ibuprofen (Advil), for any pain after the procedure. Your provider may prescribe a stronger pain medicine if it is needed.    You should be able to return to work in 48 hours, but strenuous activity should be avoided for two weeks.    Do not submerge the incision for 1 week following the procedure.    Ejaculation should be avoided for one week to allow the area to heal.    Follow-Up    You will need to have a negative post vasectomy analyses (no sperm seen) before you can discontinue birth control.    Semen Analysis   Schedule the post-vasectomy semen analysis three months after your vasectomy. You will need to ejaculate at least 20 times between your surgery and the first sample. Clinic staff will  contact your regarding your results via phone.    You will be given a container after the procedure. Collect the specimen at home by masturbation only (no lubrication, powders, saliva, or intercourse can be used) and bring it to the laboratory. You must have an appointment to drop off your specimen. The specimen needs to be delivered to the lab within 30-45 minutes.    Call [phone] with questions. For concerns or questions after hours or on weekends, please page the Urology Resident on-call: [phone].

## 2021-11-23 ENCOUNTER — MYC MEDICAL ADVICE (OUTPATIENT)
Dept: UROLOGY | Facility: CLINIC | Age: 26
End: 2021-11-23
Payer: COMMERCIAL

## 2021-11-23 DIAGNOSIS — Z30.2 ENCOUNTER FOR STERILIZATION: Primary | ICD-10-CM

## 2021-11-23 DIAGNOSIS — Z98.52 STATUS POST VASECTOMY: ICD-10-CM

## 2021-11-23 RX ORDER — CEPHALEXIN 500 MG/1
500 CAPSULE ORAL 2 TIMES DAILY
Qty: 10 CAPSULE | Refills: 0 | Status: SHIPPED | OUTPATIENT
Start: 2021-11-23 | End: 2021-11-28

## 2021-11-23 NOTE — CONFIDENTIAL NOTE
Discussed note and my chart message with Dr. Segura who recommends keflex 500mg po BID for 5 days.     Called and spoke to patient who is aware of the above information. Patient verbalized understanding. Keflex ordered per Dr. Segura. Prescription sent to [facility] in [place] per patient request. Informed patient to call or send a my chart message with any questions.    Suzy Lynn RN, BSN

## 2021-11-23 NOTE — CONFIDENTIAL NOTE
Called and spoke to patient regarding my chart message.  Patient reports right testicular pain rated 3/10. Per patient, he had a small (pencil eraser-sized) amount of dark yellow/white drainage from the right incision. Patient denies fever or temperature greater than 100.4 and chills. Per patient, he continues to use scrotal support and has stopped applying ice to the area. Patient reports that he has been following the lifting and activity restrictions.     Informed patient that this will be discussed with Dr. Segura. Patient aware that he will be contacted with additional recommendations. Patient was comfortable with plan.    Suzy Lynn RN, BSN

## 2022-02-04 ENCOUNTER — TELEPHONE (OUTPATIENT)
Dept: DERMATOLOGY | Facility: CLINIC | Age: 27
End: 2022-02-04
Payer: COMMERCIAL

## 2022-02-04 NOTE — TELEPHONE ENCOUNTER
Dr. Skelton request a Telephone visit for scalp rash/lesion be made for this patient next Thursday. Called patient and assisted in scheduling a telephone new appt. Patient is aware to send pictures via [employer] 24 hours prior to appointment.    Chrissy Braga LPN on 2/4/2022 at 1:12 PM

## 2022-02-09 NOTE — PATIENT INSTRUCTIONS
[facility] Dermatology Visit    Thank you for allowing us to participate in your care. Your findings, instructions and follow-up plan are as follows:     After 2 weeks Start lidex solution once nightly for a total of 2 weeks    When should I call my doctor?    If you are worsening or not improving, please, contact us or seek urgent care as noted below.     Who should I call with questions (adults)?    [facility] (adult and pediatric): [phone]    [facility] (adult): [phone]    For urgent needs outside of business hours call the [facility] at [phone] and ask for the dermatology resident on call    If this is a medical emergency and you are unable to reach an ER, Call [phone]    Who should I call with questions (pediatric)?  [facility]- Pediatric Dermatology  Dr. Jerri Frances, Dr. Ne Simmons, Dr. Kenyetta Aguila, Meri [facility] [place] [place]  Dr. Miracle Avery, Dr. Liss Little & Dr. Hussain Guzman  Non Urgent  Nurse Triage Line; [phone]- Manuela and Carmen RN Care Coordinators   Vani (/Complex ) [phone]    If you need a prescription refill, please contact your pharmacy. Refills are approved or denied by our physicians during normal business hours, Monday through Fridays  Per office policy, refills will not be granted if you have not been seen within the past year (or sooner depending on your child's condition).    Scheduling Information:  Pediatric Appointment Scheduling and Call Center [phone]  Radiology Scheduling- [phone]  Sedation Unit Scheduling- [phone]  [place] Scheduling- General [phone]; Pediatric Dermatology [phone]  Main  Services: [phone]  Nigerian: [phone]  Citizen of [place]: [phone]  Hmong/Sinhala/Eliseo: [phone]  Preadmission Nursing Department Fax Number: [phone] (fax all pre-operative  paperwork to this number)    For urgent matters arising during evenings, weekends, or holidays that cannot wait for normal business hours please call [phone] and ask for the dermatology resident on call to be paged.

## 2022-02-10 ENCOUNTER — VIRTUAL VISIT (OUTPATIENT)
Dept: DERMATOLOGY | Facility: CLINIC | Age: 27
End: 2022-02-10

## 2022-02-10 DIAGNOSIS — L70.0 ACNE VULGARIS: Primary | ICD-10-CM

## 2022-02-10 DIAGNOSIS — L30.9 DERMATITIS: ICD-10-CM

## 2022-02-10 PROCEDURE — 99203 OFFICE O/P NEW LOW 30 MIN: CPT | Mod: 95 | Performed by: DERMATOLOGY

## 2022-02-10 RX ORDER — FLUOCINONIDE TOPICAL SOLUTION USP, 0.05% 0.5 MG/ML
SOLUTION TOPICAL
Qty: 60 ML | Refills: 0 | Status: SHIPPED | OUTPATIENT
Start: 2022-02-10 | End: 2022-09-26

## 2022-02-10 RX ORDER — DOXYCYCLINE 100 MG/1
100 CAPSULE ORAL 2 TIMES DAILY
Qty: 180 CAPSULE | Refills: 0 | Status: SHIPPED | OUTPATIENT
Start: 2022-02-10 | End: 2022-04-01

## 2022-02-10 NOTE — PROGRESS NOTES
Teledermatology Nurse Call Patients:     Are you in the [facility] at the time of the encounter? Yes    Today's visit will be billed to you and your insurance.    A teledermatology visit is not as thorough as an in-person visit and the quality of the photograph sent may not be of the same quality as that taken by the dermatology clinic.    Patient summary of issue: acne like bumps on scalp  Location of problem on body: scalp  How long has area/symptoms been present: close to 1 year   What makes it better?: unknown prescription from GP made it temporarily better  What makes it worse?: wearing winter hat for extended periods of time  Other symptoms include the following: itch, but not painful   Which medications have been tried, for how long, and did they make it better or worse (ex. Triamcinolone used twice daily for 2 weeks, not improved): selsun blue helped relieve the discomfort, has also tried tea tree oil, and unknown prescription from GP that was temporarily effective.   The patient has not seen a dermatologist.   The patient has no history of skin cancer.   ROS: The patient is generally feeling well.    Kristin Skelton MD on 2/10/2022 at 2:46 PM    [facility] Dermatology Note  Encounter Date: Feb 10, 2022  Telephone ([phone]). Location of teledermatologist: [facility].   Start time: 3:56pm. End time: 4:06pm.    Dermatology Problem List:  1. Scalp lesions  - has done well on bactrim with PCP     ____________________________________________    Assessment & Plan:     # Acne vulgaris on face with scalp folliculitis and possibly dermatitis, consider acne keloidalis nuchae   - wait 2 weeks then start lidex solution, if this worsens it stop it and call the clinic  - Start doxycycline 100 mg BID. Recommend that patient try tocalcium-containing products around the time of taking the medication.  Instructed to take with a full glass of fluid and food, not lying  down.  Side effects of photosensitivity, headaches, GI upset discussed.     - avoid shirts with collars  - try not to trim hair for the next 4 weeks  - wash hair nightly  -use head and shoulders shampoo and selsun blue    # lymph node, recommend see PCP. I cannot see this today.   -see PCP      Procedures Performed:    None.    Follow-up: in 4 weeks derm and earlier if worsening    Staff and Scribe:     Scribe Disclosure:   I, Sidra Lam, am serving as a scribe to document services personally performed by this physician, Dr. Kristin Skelton, based on data collection and the provider's statements to me.     Provider Disclosure:   The documentation recorded by the scribe accurately reflects the services I personally performed and the decisions made by me.    Kristin Skelton MD    Department of Dermatology  [facility]: Phone: [phone], Fax:[phone]  [facility]: Phone: [phone], Fax: [phone]      ____________________________________________    CC: Derm Problem (acne like bumps on scalp )    HPI:  Mr. Chintan Harley is a(n) 26 year old male who presents today as a new patient for  Acne bumps on scalp. This patient was self referred to me and self requested an appt with me. Has been present for close to 1 year. Has unknown script from GP making it better. Wearing hat for winter. Has itch but not painful. Selsun Blue helps    Never seen derm  No hx of skin cancer     Not shaving, using elan      Possibly pill was bactrim      Shingles all over back in the past with associate lymph node >1 year ago    Patient is otherwise feeling well, without additional skin concerns.    Labs Reviewed:  N/A    Physical Exam:  Vitals: There were no vitals taken for this visit.  SKIN: Teledermatology photos were reviewed; image quality and interpretability: acceptable. Image date: 2/9/22.  -  photos on along the hairline are on the scalp  - No other lesions of concern on areas examined.     Medications:  Current Outpatient Medications   Medication     ciclopirox (PENLAC) 8 % external solution     No current facility-administered medications for this visit.      Past Medical/Surgical History:   There is no problem list on file for this patient.    No past medical history on file.    CC No referring provider defined for this encounter. on close of this encounter.

## 2022-02-10 NOTE — LETTER
2/10/2022         RE: Chintan Harley  [address] [place] [address]        Dear Colleague,    Thank you for referring your patient, Chintan Harley, to the [facility]. Please see a copy of my visit note below.    Teledermatology Nurse Call Patients:     Are you in the [facility] at the time of the encounter? Yes    Today's visit will be billed to you and your insurance.    A teledermatology visit is not as thorough as an in-person visit and the quality of the photograph sent may not be of the same quality as that taken by the dermatology clinic.    Patient summary of issue: acne like bumps on scalp  Location of problem on body: scalp  How long has area/symptoms been present: close to 1 year   What makes it better?: unknown prescription from GP made it temporarily better  What makes it worse?: wearing winter hat for extended periods of time  Other symptoms include the following: itch, but not painful   Which medications have been tried, for how long, and did they make it better or worse (ex. Triamcinolone used twice daily for 2 weeks, not improved): selsun blue helped relieve the discomfort, has also tried tea tree oil, and unknown prescription from GP that was temporarily effective.   The patient has not seen a dermatologist.   The patient has no history of skin cancer.   ROS: The patient is generally feeling well.    Kristin Skelton MD on 2/10/2022 at 2:46 PM    [facility] Dermatology Note  Encounter Date: Feb 10, 2022  Telephone ([phone]). Location of teledermatologist: [facility].   Start time: 3:56pm. End time: 4:06pm.    Dermatology Problem List:  1. Scalp lesions  - has done well on bactrim with PCP     ____________________________________________    Assessment & Plan:     # Acne vulgaris on face with scalp folliculitis and possibly dermatitis, consider acne keloidalis nuchae   - wait 2 weeks then start  lidex solution, if this worsens it stop it and call the clinic  - Start doxycycline 100 mg BID. Recommend that patient try tocalcium-containing products around the time of taking the medication.  Instructed to take with a full glass of fluid and food, not lying down.  Side effects of photosensitivity, headaches, GI upset discussed.     - avoid shirts with collars  - try not to trim hair for the next 4 weeks  - wash hair nightly  -use head and shoulders shampoo and selsun blue    # lymph node, recommend see PCP. I cannot see this today.   -see PCP      Procedures Performed:    None.    Follow-up: in 4 weeks derm and earlier if worsening    Staff and Scribe:     Scribe Disclosure:   I, Sidra Lam, am serving as a scribe to document services personally performed by this physician, Dr. Kristin Skelton, based on data collection and the provider's statements to me.     Provider Disclosure:   The documentation recorded by the scribe accurately reflects the services I personally performed and the decisions made by me.    Kristin Skelton MD    Department of Dermatology  [facility]: Phone: [phone], Fax:[phone]  [facility]: Phone: [phone], Fax: [phone]      ____________________________________________    CC: Derm Problem (acne like bumps on scalp )    HPI:  Mr. Chintan Harley is a(n) 26 year old male who presents today as a new patient for  Acne bumps on scalp. This patient was self referred to me and self requested an appt with me. Has been present for close to 1 year. Has unknown script from GP making it better. Wearing hat for winter. Has itch but not painful. Selsun Blue helps    Never seen derm  No hx of skin cancer     Not shaving, using elan      Possibly pill was bactrim      Shingles all over back in the past with associate lymph node >1 year ago    Patient is  otherwise feeling well, without additional skin concerns.    Labs Reviewed:  N/A    Physical Exam:  Vitals: There were no vitals taken for this visit.  SKIN: Teledermatology photos were reviewed; image quality and interpretability: acceptable. Image date: 2/9/22.  - photos on along the hairline are on the scalp  - No other lesions of concern on areas examined.     Medications:  Current Outpatient Medications   Medication     ciclopirox (PENLAC) 8 % external solution     No current facility-administered medications for this visit.      Past Medical/Surgical History:   There is no problem list on file for this patient.    No past medical history on file.    CC No referring provider defined for this encounter. on close of this encounter.      Again, thank you for allowing me to participate in the care of your patient.        Sincerely,        Kristin Skelton MD

## 2022-03-07 ENCOUNTER — VIRTUAL VISIT (OUTPATIENT)
Dept: DERMATOLOGY | Facility: CLINIC | Age: 27
End: 2022-03-07
Payer: COMMERCIAL

## 2022-03-07 DIAGNOSIS — Z87.2 HISTORY OF FOLLICULITIS: Primary | ICD-10-CM

## 2022-03-07 PROCEDURE — 99213 OFFICE O/P EST LOW 20 MIN: CPT | Mod: 95 | Performed by: DERMATOLOGY

## 2022-03-07 NOTE — NURSING NOTE
Teledermatology Nurse Call Patients:     Are you in the [facility] at the time of the encounter? yes    Today's visit will be billed to you and your insurance.    A teledermatology visit is not as thorough as an in-person visit and the quality of the photograph sent may not be of the same quality as that taken by the dermatology clinic.      Medications and allergies reviewed with patient.     DIMAS Fitzgerald

## 2022-03-07 NOTE — PROGRESS NOTES
[facility] Dermatology Note  Encounter Date: Mar 7, 2022  Telephone ([phone]). Location of teledermatologist: [facility]. Start time: 1:53pm. End time: 2pm.    Dermatology Problem List:  1. Scalp lesions  - has done well on bactrim with PCP     ____________________________________________     Assessment & Plan:      # Acne vulgaris on face with scalp folliculitis and possibly dermatitis- now resolved   - wait 2 weeks then start lidex solution, if this worsens it stop it and call the clinic  -doxycyline until April 1, stop early  - stop solution lidex   -acne on forehead is better, he can consider differin, declines benzaclin      # lymph node- pt reports lesions is resolved.       # general dry skin care - pt reports no rash  -vanicream after shower from the neck      Procedures Performed:    None    Follow-up:  As needed    Staff:     Kristin Skelton MD    Department of Dermatology  [facility]: Phone: [phone], Fax:[phone]  [facility]: Phone: [phone], Fax: [phone]      ____________________________________________    CC: Acne    HPI:  Mr. Chintan Harley is a(n) 26 year old male who presents today as a return patient for scalp. He feels his scalp is clear.   The patient denies headache, vision, GI upset or sun burn.     Did not start solution    Pt reports other skin issue. Shins, forearm, upper arms. He feels it is dry.       Patient is otherwise feeling well, without additional skin concerns.    Labs Reviewed:  Na    Physical Exam:  Vitals: There were no vitals taken for this visit.  SKIN: Teledermatology photos were reviewed; image quality and interpretability: acceptable. Image date: today.  -no acne, no erythema  - No other lesions of concern on areas examined.     Medications:  Current Outpatient Medications    Medication     doxycycline monohydrate (MONODOX) 100 MG capsule     fluocinonide (LIDEX) 0.05 % external solution     No current facility-administered medications for this visit.      Past Medical/Surgical History:   There is no problem list on file for this patient.    No past medical history on file.    CC No referring provider defined for this encounter. on close of this encounter.

## 2022-03-07 NOTE — LETTER
3/7/2022         RE: Chintan Harley  [address] [place] [address]        Dear Colleague,    Thank you for referring your patient, Chintan Harley, to the [facility]. Please see a copy of my visit note below.    [facility] Dermatology Note  Encounter Date: Mar 7, 2022  Telephone ([phone]). Location of teledermatologist: [facility]. Start time: 1:53pm. End time: 2pm.    Dermatology Problem List:  1. Scalp lesions  - has done well on bactrim with PCP     ____________________________________________     Assessment & Plan:      # Acne vulgaris on face with scalp folliculitis and possibly dermatitis- now resolved   - wait 2 weeks then start lidex solution, if this worsens it stop it and call the clinic  -doxycyline until April 1, stop early  - stop solution lidex   -acne on forehead is better, he can consider differin, declines benzaclin      # lymph node- pt reports lesions is resolved.       # general dry skin care - pt reports no rash  -vanicream after shower from the neck      Procedures Performed:    None    Follow-up:  As needed    Staff:     Kristin Skelton MD    Department of Dermatology  [facility]: Phone: [phone], Fax:[phone]  [facility]: Phone: [phone], Fax: [phone]      ____________________________________________    CC: Acne    HPI:  Mr. Chintan Harley is a(n) 26 year old male who presents today as a return patient for scalp. He feels his scalp is clear.   The patient denies headache, vision, GI upset or sun burn.     Did not start solution    Pt reports other skin issue. Shins, forearm, upper arms. He feels it is dry.       Patient is otherwise feeling well, without additional skin concerns.    Labs Reviewed:  Na    Physical Exam:  Vitals: There were no  vitals taken for this visit.  SKIN: Teledermatology photos were reviewed; image quality and interpretability: acceptable. Image date: today.  -no acne, no erythema  - No other lesions of concern on areas examined.     Medications:  Current Outpatient Medications   Medication     doxycycline monohydrate (MONODOX) 100 MG capsule     fluocinonide (LIDEX) 0.05 % external solution     No current facility-administered medications for this visit.      Past Medical/Surgical History:   There is no problem list on file for this patient.    No past medical history on file.    CC No referring provider defined for this encounter. on close of this encounter.      Again, thank you for allowing me to participate in the care of your patient.        Sincerely,        Kristin Skelton MD

## 2022-03-07 NOTE — PATIENT INSTRUCTIONS
[facility] Dermatology Visit    Thank you for allowing us to participate in your care. Your findings, instructions and follow-up plan are as follows:         When should I call my doctor?    If you are worsening or not improving, please, contact us or seek urgent care as noted below.     Who should I call with questions (adults)?    [facility] (adult and pediatric): [phone]    [facility] (adult): [phone]    For urgent needs outside of business hours call the [facility] at [phone] and ask for the dermatology resident on call    If this is a medical emergency and you are unable to reach an ER, Call 911    Who should I call with questions (pediatric)?  [facility]- Pediatric Dermatology  Dr. Jerri Frances, Dr. Ne Simmons, Dr. Kenyetta Aguila, Meri Vargas, [place]  Dr. Miracle Avery, Dr. Liss Little & Dr. Hussain Guzman  Non Urgent  Nurse Triage Line; [phone]- Manuela and Carmen MEREDITH Care Coordinators   Vani (/Complex ) [phone]    If you need a prescription refill, please contact your pharmacy. Refills are approved or denied by our physicians during normal business hours, Monday through Fridays  Per office policy, refills will not be granted if you have not been seen within the past year (or sooner depending on your child's condition).    Scheduling Information:  Pediatric Appointment Scheduling and Call Center [phone]  Radiology Scheduling- [phone]  Sedation Unit Scheduling- [phone]  [place] Scheduling- General [phone]; Pediatric Dermatology [phone]  Main  Services: [phone]  Tajik: [phone]  Danish: [phone]  Hmong/Occitan/Arabic: [phone]  Preadmission Nursing Department Fax Number: [phone] (fax all pre-operative paperwork to this number)    For urgent matters arising during evenings,  weekends, or holidays that cannot wait for normal business hours please call [phone] and ask for the dermatology resident on call to be paged.          For the body, start vanicream twice daily from the neck down.

## 2022-04-01 ENCOUNTER — VIRTUAL VISIT (OUTPATIENT)
Dept: DERMATOLOGY | Facility: CLINIC | Age: 27
End: 2022-04-01
Payer: COMMERCIAL

## 2022-04-01 DIAGNOSIS — L73.9 FOLLICULITIS: Primary | ICD-10-CM

## 2022-04-01 DIAGNOSIS — L70.0 ACNE VULGARIS: ICD-10-CM

## 2022-04-01 PROCEDURE — 99214 OFFICE O/P EST MOD 30 MIN: CPT | Mod: 95 | Performed by: DERMATOLOGY

## 2022-04-01 RX ORDER — CLINDAMYCIN PHOSPHATE 10 UG/ML
LOTION TOPICAL
Qty: 60 ML | Refills: 3 | Status: SHIPPED | OUTPATIENT
Start: 2022-04-01 | End: 2023-03-02

## 2022-04-01 RX ORDER — DOXYCYCLINE 100 MG/1
100 CAPSULE ORAL 2 TIMES DAILY
Qty: 180 CAPSULE | Refills: 0 | Status: SHIPPED | OUTPATIENT
Start: 2022-04-01 | End: 2023-03-02

## 2022-04-01 ASSESSMENT — PAIN SCALES - GENERAL: PAINLEVEL: NO PAIN (0)

## 2022-04-01 NOTE — PATIENT INSTRUCTIONS
[facility] Dermatology Visit    Thank you for allowing us to participate in your care. Your findings, instructions and follow-up plan are as follows:         When should I call my doctor?    If you are worsening or not improving, please, contact us or seek urgent care as noted below.     Who should I call with questions (adults)?    [facility] (adult and pediatric): [phone]    [facility] (adult): [phone]    For urgent needs outside of business hours call the [facility] at [phone] and ask for the dermatology resident on call    If this is a medical emergency and you are unable to reach an ER, Call 911    Who should I call with questions (pediatric)?  [facility]- Pediatric Dermatology  Dr. Jerri Frances, Dr. Ne Simmons, Dr. Kenyetta Aguila, Meri Vargas, [place]  Dr. Miracle Avery, Dr. Liss Little & Dr. Hussain Guzman  Non Urgent  Nurse Triage Line; [phone]- Manuela and Carmen MEREDITH Care Coordinators   Vnai (/Complex ) [phone]    If you need a prescription refill, please contact your pharmacy. Refills are approved or denied by our physicians during normal business hours, Monday through Fridays  Per office policy, refills will not be granted if you have not been seen within the past year (or sooner depending on your child's condition).    Scheduling Information:  Pediatric Appointment Scheduling and Call Center [phone]  Radiology Scheduling- [phone]  Sedation Unit Scheduling- [phone]  [place] Scheduling- General [phone]; Pediatric Dermatology [phone]  Main  Services: [phone]  Lithuanian: [phone]  Iranian: [phone]  Hmong/Sami/Nepali: [phone]  Preadmission Nursing Department Fax Number: [phone] (fax all pre-operative paperwork to this number)    For urgent matters arising during evenings,  weekends, or holidays that cannot wait for normal business hours please call [phone] and ask for the dermatology resident on call to be paged.

## 2022-04-01 NOTE — NURSING NOTE
"Chintan Harley's goals for this visit include:   Chief Complaint   Patient presents with     Derm Problem     scalp acne, pt states \"they are starting to go down, still using same medication - unsure of what it is called\"       He requests these members of his care team be copied on today's visit information:     PCP: No Ref-Primary, Physician    Referring Provider:  No referring provider defined for this encounter.    There were no vitals taken for this visit.    Do you need any medication refills at today's visit? No    Selam Beverly LPN      "

## 2022-04-01 NOTE — LETTER
"    4/1/2022         RE: Chintan Harley  [address] [place] [address]        Dear Colleague,    Thank you for referring your patient, Chintan Harley, to the [facility]. Please see a copy of my visit note below.    [facility] Dermatology Note  Encounter Date: Apr 1, 2022  [employer]-and-Forward and Telephone ([phone]). Location of teledermatologist: [facility].  Start time: 11:39am. End time: 11:43am.    Dermatology Problem List:  1. Scalp lesions  - has done well on bactrim with PCP    ____________________________________________    Assessment & Plan:     # Acne vulgaris on face with scalp folliculitis and possibly dermatitis-  Triggered again with buzzer but now improving. Needs doxy refil  -limit buzzing at salon   -take doxycycline to complete 3 month  - okay to lidex if needed  -clindamycin solution for 3 weeks  -reviewed doxy and sunburns/headaches and eye issues and he has jim fine  -acne on forehead is better, he can consider differin, declines benzaclin       Procedures Performed:    None    Follow-up: with Dr. Wheat as scheduled    Staff and Scribe:     Scribe Disclosure:   I, Javier Klein, am serving as a scribe to document services personally performed by this physician, Dr. Kristin Skelton, based on data collection and the provider's statements to me.     Provider Disclosure:   The documentation recorded by the scribe accurately reflects the services I personally performed and the decisions made by me.    Kristin Skelton MD    Department of Dermatology  [facility]: Phone: [phone], Fax:[phone]  [facility]: Phone: [phone], Fax: [phone]      ____________________________________________    CC: Derm Problem (scalp acne, pt states \"they are starting to go down, still using " "same medication - unsure of what it is called\")    HPI:  Mr. Chintan Harley is a(n) 26 year old male who presents today as a return patient for acne.    Last seen via virtual visit on 3/7/22.  At that time, he was continued on doxycycline and instructed to consider differin for the forehead.    Today, he reports he went to the  and they used the buzzer on the head and scalp become irritated. Then there was bleeding    Patient is otherwise feeling well, without additional skin concerns.    Labs Reviewed:  NA    Physical Exam:  Vitals: There were no vitals taken for this visit.  SKIN: Teledermatology photos were reviewed; image quality and interpretability: acceptable. Image date:  3/29/2022.  -  Photos with pustule and papules, scalp  - No other lesions of concern on areas examined.     Medications:  Current Outpatient Medications   Medication     doxycycline monohydrate (MONODOX) 100 MG capsule     fluocinonide (LIDEX) 0.05 % external solution     No current facility-administered medications for this visit.      Past Medical/Surgical History:   There is no problem list on file for this patient.    No past medical history on file.    CC No referring provider defined for this encounter. on close of this encounter.      Again, thank you for allowing me to participate in the care of your patient.        Sincerely,        Kristin Skelton MD    "

## 2022-04-01 NOTE — PROGRESS NOTES
"[facility] Dermatology Note  Encounter Date: Apr 1, 2022  Store-and-Forward and Telephone ([phone]). Location of teledermatologist: [facility].  Start time: 11:39am. End time: 11:43am.    Dermatology Problem List:  1. Scalp lesions  - has done well on bactrim with PCP    ____________________________________________    Assessment & Plan:     # Acne vulgaris on face with scalp folliculitis and possibly dermatitis-  Triggered again with buzzer but now improving. Needs doxy refil  -limit buzzing at salon   -take doxycycline to complete 3 month  - okay to lidex if needed  -clindamycin solution for 3 weeks  -reviewed doxy and sunburns/headaches and eye issues and he has jim fine  -acne on forehead is better, he can consider differin, declines benzaclin       Procedures Performed:    None    Follow-up: with Dr. Wheat as scheduled    Staff and Scribe:     Scribe Disclosure:   I, Javier Klein, am serving as a scribe to document services personally performed by this physician, Dr. Kristin Skelton, based on data collection and the provider's statements to me.     Provider Disclosure:   The documentation recorded by the scribe accurately reflects the services I personally performed and the decisions made by me.    Kristin Skelton MD    Department of Dermatology  [facility]: Phone: [phone], Fax:[phone]  [facility]: Phone: [phone], Fax: [phone]      ____________________________________________    CC: Derm Problem (scalp acne, pt states \"they are starting to go down, still using same medication - unsure of what it is called\")    HPI:  Mr. Chintan Harley is a(n) 26 year old male who presents today as a return patient for acne.    Last seen via virtual visit on 3/7/22.  At that time, he was continued on doxycycline and instructed to " consider differin for the forehead.    Today, he reports he went to the  and they used the buzzer on the head and scalp become irritated. Then there was bleeding    Patient is otherwise feeling well, without additional skin concerns.    Labs Reviewed:  NA    Physical Exam:  Vitals: There were no vitals taken for this visit.  SKIN: Teledermatology photos were reviewed; image quality and interpretability: acceptable. Image date:  3/29/2022.  -  Photos with pustule and papules, scalp  - No other lesions of concern on areas examined.     Medications:  Current Outpatient Medications   Medication     doxycycline monohydrate (MONODOX) 100 MG capsule     fluocinonide (LIDEX) 0.05 % external solution     No current facility-administered medications for this visit.      Past Medical/Surgical History:   There is no problem list on file for this patient.    No past medical history on file.    CC No referring provider defined for this encounter. on close of this encounter.

## 2022-08-28 ENCOUNTER — HEALTH MAINTENANCE LETTER (OUTPATIENT)
Age: 27
End: 2022-08-28

## 2022-09-26 ENCOUNTER — OFFICE VISIT (OUTPATIENT)
Dept: DERMATOLOGY | Facility: CLINIC | Age: 27
End: 2022-09-26
Payer: COMMERCIAL

## 2022-09-26 DIAGNOSIS — L30.9 DERMATITIS: ICD-10-CM

## 2022-09-26 DIAGNOSIS — L21.9 DERMATITIS, SEBORRHEIC: Primary | ICD-10-CM

## 2022-09-26 PROCEDURE — 99214 OFFICE O/P EST MOD 30 MIN: CPT | Performed by: DERMATOLOGY

## 2022-09-26 RX ORDER — FLUOCINONIDE TOPICAL SOLUTION USP, 0.05% 0.5 MG/ML
SOLUTION TOPICAL
Qty: 60 ML | Refills: 11 | Status: SHIPPED | OUTPATIENT
Start: 2022-09-26 | End: 2023-03-02

## 2022-09-26 RX ORDER — KETOCONAZOLE 20 MG/ML
SHAMPOO TOPICAL
Qty: 120 ML | Refills: 11 | Status: SHIPPED | OUTPATIENT
Start: 2022-09-26 | End: 2024-10-01

## 2022-09-26 NOTE — LETTER
9/26/2022         RE: Chintan Harley  [address] [place] [address]        Dear Colleague,    Thank you for referring your patient, Chintan Harley, to the [facility]. Please see a copy of my visit note below.    [facility] Dermatology Note  Encounter Date: Sep 26, 2022  Office Visit     Dermatology Problem List:  1. Scalp lesions  - has done well on bactrim with PCP  2. Seborrheic dermatitis  - Current tx: ketoconazole 2% shampoo, Lidex 0.05% solution, OTC T/Gel      ____________________________________________     Assessment & Plan:     # Seborrheic dermatitis - scalp. Chronic, flare.   Reviewed etiology of the condition. Discussed efficacy and benefits of ketoconazole shampoo.   - Start ketoconazole 2% shampoo three times weekly  - Recommended using T/Gel in conjunction with ketoconazole.  - Continue Lidex 0.05% solution at nighttime as needed.    Procedures Performed:   None.    Follow-up: 4 month(s) in-person, or earlier for new or changing lesions    Staff and Scribe:     Scribe Disclosure:   I, Ludin Summers, am serving as a scribe to document services personally performed by this physician, Dr. Hever Wheat, based on data collection and the provider's statements to me.     Provider Disclosure:   The documentation recorded by the scribe accurately reflects the services I personally performed and the decisions made by me.    Hever Wheat MD    Department of Dermatology  [facility]: Phone: [phone], Fax:[phone]  [facility]: Phone: [phone] Fax: [phone]  ____________________________________________    CC: RECHECK (Acne vulgaris on face with scalp folliculitis. Patient currently using Lidex solution prn. )    HPI:  Mr. Chintan Harley is a(n) 27 year old male who presents today as a return  patient for acne.    Last seen virtually 4/1/22 by Dr. Skelton for acne. AT that time, patient was instructed to continue doxycycline and clindamycin solution, okay to use lidex if needed.    Today, he notes that his acne has been worse since he visited [place] 2 weeks ago. He notes that his scalp flares when he is outside and sweating. He notes that he used to use Head & Shoulders with no improvement, so he started using another shampoo after recommendations from a hair salon.    The patient otherwise denies any new or concerning lesions. No bleeding, painful, pruritic, or changing lesions. No other skin concerns.       Labs Reviewed:  N/A    Physical Exam:  Vitals: There were no vitals taken for this visit.  SKIN: Focused examination of the scalp, face was performed.  - Diffuse pink erythema and scale on bilateral scalp, particularly occipital scalp.  - No other lesions of concern on areas examined.     Medications:  Current Outpatient Medications   Medication     clindamycin (CLEOCIN T) 1 % external lotion     doxycycline monohydrate (MONODOX) 100 MG capsule     fluocinonide (LIDEX) 0.05 % external solution     No current facility-administered medications for this visit.      Past Medical History:   There is no problem list on file for this patient.    No past medical history on file.         Again, thank you for allowing me to participate in the care of your patient.        Sincerely,        Hever Wheta MD

## 2022-09-26 NOTE — PATIENT INSTRUCTIONS
You can try using T/Gel shampoo in addition to the ketoconazole shampoo. It is available at most major retailers or online on [employer].

## 2022-09-26 NOTE — NURSING NOTE
Chintan Harley's goals for this visit include:   Chief Complaint   Patient presents with     RECHECK     Acne vulgaris on face with scalp folliculitis. Patient currently using Lidex solution prn.        He requests these members of his care team be copied on today's visit information:     PCP: No Ref-Primary, Physician    Referring Provider:  Referred Self, MD  No address on file    There were no vitals taken for this visit.    Do you need any medication refills at today's visit? Liana Trujillo [facility]

## 2022-09-26 NOTE — PROGRESS NOTES
[facility] Dermatology Note  Encounter Date: Sep 26, 2022  Office Visit     Dermatology Problem List:  1. Scalp lesions  - has done well on bactrim with PCP  2. Seborrheic dermatitis  - Current tx: ketoconazole 2% shampoo, Lidex 0.05% solution, OTC T/Gel      ____________________________________________     Assessment & Plan:     # Seborrheic dermatitis - scalp. Chronic, flare.   Reviewed etiology of the condition. Discussed efficacy and benefits of ketoconazole shampoo.   - Start ketoconazole 2% shampoo three times weekly  - Recommended using T/Gel in conjunction with ketoconazole.  - Continue Lidex 0.05% solution at nighttime as needed.    Procedures Performed:   None.    Follow-up: 4 month(s) in-person, or earlier for new or changing lesions    Staff and Scribe:     Scribe Disclosure:   I, Ludin Summers, am serving as a scribe to document services personally performed by this physician, Dr. Hever Wheat, based on data collection and the provider's statements to me.     Provider Disclosure:   The documentation recorded by the scribe accurately reflects the services I personally performed and the decisions made by me.    Hever Wheat MD    Department of Dermatology  [facility]: Phone: [phone], Fax:[phone]  [facility]: Phone: [phone] Fax: [phone]  ____________________________________________    CC: RECHECK (Acne vulgaris on face with scalp folliculitis. Patient currently using Lidex solution prn. )    HPI:  Mr. Chintan Harley is a(n) 27 year old male who presents today as a return patient for acne.    Last seen virtually 4/1/22 by Dr. Skelton for acne. AT that time, patient was instructed to continue doxycycline and clindamycin solution, okay to use lidex if needed.    Today, he notes that his acne has been worse since he visited [place] [address]  weeks ago. He notes that his scalp flares when he is outside and sweating. He notes that he used to use Head & Shoulders with no improvement, so he started using another shampoo after recommendations from a hair salon.    The patient otherwise denies any new or concerning lesions. No bleeding, painful, pruritic, or changing lesions. No other skin concerns.       Labs Reviewed:  N/A    Physical Exam:  Vitals: There were no vitals taken for this visit.  SKIN: Focused examination of the scalp, face was performed.  - Diffuse pink erythema and scale on bilateral scalp, particularly occipital scalp.  - No other lesions of concern on areas examined.     Medications:  Current Outpatient Medications   Medication     clindamycin (CLEOCIN T) 1 % external lotion     doxycycline monohydrate (MONODOX) 100 MG capsule     fluocinonide (LIDEX) 0.05 % external solution     No current facility-administered medications for this visit.      Past Medical History:   There is no problem list on file for this patient.    No past medical history on file.

## 2023-02-06 ENCOUNTER — OFFICE VISIT (OUTPATIENT)
Dept: DERMATOLOGY | Facility: CLINIC | Age: 28
End: 2023-02-06
Payer: COMMERCIAL

## 2023-02-06 DIAGNOSIS — L21.9 DERMATITIS, SEBORRHEIC: Primary | ICD-10-CM

## 2023-02-06 PROCEDURE — 99213 OFFICE O/P EST LOW 20 MIN: CPT | Performed by: DERMATOLOGY

## 2023-02-06 RX ORDER — CALCIPOTRIENE 0.05 MG/ML
SOLUTION TOPICAL
Qty: 60 ML | Refills: 11 | Status: SHIPPED | OUTPATIENT
Start: 2023-02-06 | End: 2024-10-01

## 2023-02-06 ASSESSMENT — PAIN SCALES - GENERAL: PAINLEVEL: NO PAIN (0)

## 2023-02-06 NOTE — LETTER
2/6/2023         RE: Chintan Harley  [address] [place] [address]        Dear Colleague,    Thank you for referring your patient, Chintan Harley, to the [facility]. Please see a copy of my visit note below.    [facility] Dermatology Note  Encounter Date: Feb 6, 2023  Office Visit     Dermatology Problem List:  1. Scalp lesions  - has done well on bactrim with PCP  2. Seborrheic dermatitis  - Current tx: ketoconazole 2% shampoo, OTC T/Gel   - Previous tx: Lidex 0.05% solution     ____________________________________________     Assessment & Plan:      # Seborrheic dermatitis - scalp. Chronic, stable. Discussed the etiology and progression of the condition. Well controlled with T/Gel, but ketoconazole not very effective. He reports he has not continued Lidex solution due to inefficacy. Discussed further treatment with topical steroid oils and calcipotriene. After discussion, patient elects to try T/Sal shampoo, will fill prescription of calcipotriene if not working well.   - Start T-sal shampoo prior to using T-gel shampoo in shower at least 3x weekly.   - Start calcipotriene 0.005% solution at bedtime PRN flares.     Procedures Performed:   None.    Follow-up: prn, or earlier for new or changing lesions    Staff and Scribe:     Scribe Disclosure:   I, Ludin Summers, am serving as a scribe to document services personally performed by this physician, Dr. Hever Wheat, based on data collection and the provider's statements to me.     Provider Disclosure:   The documentation recorded by the scribe accurately reflects the services I personally performed and the decisions made by me.    Hever Wheat MD    Department of Dermatology  [facility]: Phone: [phone], Fax:[phone]  [facility]: Phone: [phone] Fax:  [phone]  ____________________________________________    CC: Derm Problem (Follow up on ketoconazole shampoo and T/Gel use.  Pt states when he has flare ups, uses T/gel to stop the inflammation. Has not noticed progress when using the ketoconazole shampoo. He's wondering if this will fully clear up his seborrheic dermatitis or if it just helps control it?  Also, he is wondering Is it hereditary? )    HPI:  Mr. Chintan Harley is a(n) 27 year old male who presents today as a return patient for seborrheic dermatitis.    Last seen 9/26/22 for seborrheic dermatitis. At that time, patient was instructed to start ketoconazole 2% shampoo three times weekly and continue lidex solution as needed.     Today, he reports that T/Gel has been effective during flares, but ketoconazole has not. He is wondering how long he will have to use his topicals and if there are any cures.    The patient otherwise denies any new or concerning lesions. No bleeding, painful, pruritic, or changing lesions. Health otherwise stable. No other skin concerns.     Labs Reviewed:  N/A    Physical Exam:  Vitals: There were no vitals taken for this visit.  SKIN: Focused examination of scalp was performed.  - Few areas of pink erythema and scale on the occipital scalp.   - No other lesions of concern on areas examined.     Medications:  Current Outpatient Medications   Medication     ketoconazole (NIZORAL) 2 % external shampoo     clindamycin (CLEOCIN T) 1 % external lotion     doxycycline monohydrate (MONODOX) 100 MG capsule     fluocinonide (LIDEX) 0.05 % external solution     No current facility-administered medications for this visit.      Past Medical History:   There is no problem list on file for this patient.    No past medical history on file.         Again, thank you for allowing me to participate in the care of your patient.        Sincerely,        Hever Wheat MD

## 2023-02-06 NOTE — NURSING NOTE
Chintan Harley's chief complaint for this visit includes:  Chief Complaint   Patient presents with     Derm Problem     Follow up on ketoconazole shampoo and T/Gel use.  Pt states when he has flare ups, uses T/gel to stop the inflammation. Has not noticed progress when using the ketoconazole shampoo. He's wondering if this will fully clear up is seborrheic dermatitis or if it just helps control it.  Also, he is wondering Is it hereditary?      PCP: No Ref-Primary, Physician    Referring Provider:  No referring provider defined for this encounter.    There were no vitals taken for this visit.  No Pain (0)      No Known Allergies      Do you need any medication refills at today's visit?  No .    Demetria Hare LPN

## 2023-02-06 NOTE — PROGRESS NOTES
[facility] Dermatology Note  Encounter Date: Feb 6, 2023  Office Visit     Dermatology Problem List:  1. Scalp lesions  - has done well on bactrim with PCP  2. Seborrheic dermatitis  - Current tx: ketoconazole 2% shampoo, OTC T/Gel   - Previous tx: Lidex 0.05% solution     ____________________________________________     Assessment & Plan:      # Seborrheic dermatitis - scalp. Chronic, stable. Discussed the etiology and progression of the condition. Well controlled with T/Gel, but ketoconazole not very effective. He reports he has not continued Lidex solution due to inefficacy. Discussed further treatment with topical steroid oils and calcipotriene. After discussion, patient elects to try T/Sal shampoo, will fill prescription of calcipotriene if not working well.   - Start T-sal shampoo prior to using T-gel shampoo in shower at least 3x weekly.   - Start calcipotriene 0.005% solution at bedtime PRN flares.     Procedures Performed:   None.    Follow-up: prn, or earlier for new or changing lesions    Staff and Scribe:     Scribe Disclosure:   I, Ludin Summers, am serving as a scribe to document services personally performed by this physician, Dr. Hever Wheat, based on data collection and the provider's statements to me.     Provider Disclosure:   The documentation recorded by the scribe accurately reflects the services I personally performed and the decisions made by me.    Hever Wheat MD    Department of Dermatology  [facility]: Phone: [phone], Fax:[phone]  [facility]: Phone: [phone] Fax: [phone]  ____________________________________________    CC: Derm Problem (Follow up on ketoconazole shampoo and T/Gel use.  Pt states when he has flare ups, uses T/gel to stop the inflammation. Has not noticed progress when using the ketoconazole shampoo. He's  wondering if this will fully clear up his seborrheic dermatitis or if it just helps control it?  Also, he is wondering Is it hereditary? )    HPI:  Mr. Chintan Harley is a(n) 27 year old male who presents today as a return patient for seborrheic dermatitis.    Last seen 9/26/22 for seborrheic dermatitis. At that time, patient was instructed to start ketoconazole 2% shampoo three times weekly and continue lidex solution as needed.     Today, he reports that T/Gel has been effective during flares, but ketoconazole has not. He is wondering how long he will have to use his topicals and if there are any cures.    The patient otherwise denies any new or concerning lesions. No bleeding, painful, pruritic, or changing lesions. Health otherwise stable. No other skin concerns.     Labs Reviewed:  N/A    Physical Exam:  Vitals: There were no vitals taken for this visit.  SKIN: Focused examination of scalp was performed.  - Few areas of pink erythema and scale on the occipital scalp.   - No other lesions of concern on areas examined.     Medications:  Current Outpatient Medications   Medication     ketoconazole (NIZORAL) 2 % external shampoo     clindamycin (CLEOCIN T) 1 % external lotion     doxycycline monohydrate (MONODOX) 100 MG capsule     fluocinonide (LIDEX) 0.05 % external solution     No current facility-administered medications for this visit.      Past Medical History:   There is no problem list on file for this patient.    No past medical history on file.

## 2023-03-02 ENCOUNTER — OFFICE VISIT (OUTPATIENT)
Dept: FAMILY MEDICINE | Facility: CLINIC | Age: 28
End: 2023-03-02
Payer: COMMERCIAL

## 2023-03-02 VITALS
BODY MASS INDEX: 23.61 KG/M2 | DIASTOLIC BLOOD PRESSURE: 75 MMHG | WEIGHT: 155.8 LBS | RESPIRATION RATE: 14 BRPM | OXYGEN SATURATION: 97 % | TEMPERATURE: 97.5 F | HEIGHT: 68 IN | SYSTOLIC BLOOD PRESSURE: 116 MMHG | HEART RATE: 67 BPM

## 2023-03-02 DIAGNOSIS — Z11.4 SCREENING FOR HIV (HUMAN IMMUNODEFICIENCY VIRUS): ICD-10-CM

## 2023-03-02 DIAGNOSIS — Z00.00 ROUTINE GENERAL MEDICAL EXAMINATION AT A HEALTH CARE FACILITY: Primary | ICD-10-CM

## 2023-03-02 DIAGNOSIS — R22.9 LOCALIZED SUPERFICIAL SWELLING, MASS, OR LUMP: ICD-10-CM

## 2023-03-02 DIAGNOSIS — Z11.59 NEED FOR HEPATITIS C SCREENING TEST: ICD-10-CM

## 2023-03-02 PROCEDURE — 90471 IMMUNIZATION ADMIN: CPT | Performed by: FAMILY MEDICINE

## 2023-03-02 PROCEDURE — 99213 OFFICE O/P EST LOW 20 MIN: CPT | Mod: 25 | Performed by: FAMILY MEDICINE

## 2023-03-02 PROCEDURE — 90686 IIV4 VACC NO PRSV 0.5 ML IM: CPT | Performed by: FAMILY MEDICINE

## 2023-03-02 PROCEDURE — 91312 COVID-19 VACCINE BIVALENT BOOSTER 12+ (PFIZER): CPT | Performed by: FAMILY MEDICINE

## 2023-03-02 PROCEDURE — 90472 IMMUNIZATION ADMIN EACH ADD: CPT | Performed by: FAMILY MEDICINE

## 2023-03-02 PROCEDURE — 0124A COVID-19 VACCINE BIVALENT BOOSTER 12+ (PFIZER): CPT | Performed by: FAMILY MEDICINE

## 2023-03-02 PROCEDURE — 90651 9VHPV VACCINE 2/3 DOSE IM: CPT | Performed by: FAMILY MEDICINE

## 2023-03-02 PROCEDURE — 99395 PREV VISIT EST AGE 18-39: CPT | Mod: 25 | Performed by: FAMILY MEDICINE

## 2023-03-02 ASSESSMENT — ENCOUNTER SYMPTOMS
NAUSEA: 0
EYE PAIN: 0
FEVER: 0
WEAKNESS: 0
ABDOMINAL PAIN: 0
SHORTNESS OF BREATH: 0
MYALGIAS: 0
CONSTIPATION: 0
FREQUENCY: 0
SORE THROAT: 0
HEADACHES: 0
DYSURIA: 0
DIZZINESS: 0
HEMATOCHEZIA: 0
PALPITATIONS: 0
NERVOUS/ANXIOUS: 0
CHILLS: 0
HEARTBURN: 0
HEMATURIA: 0
PARESTHESIAS: 0

## 2023-03-02 ASSESSMENT — PAIN SCALES - GENERAL: PAINLEVEL: NO PAIN (0)

## 2023-03-02 NOTE — NURSING NOTE
Prior to immunization administration, verified patients identity using patient s name and date of birth. Please see Immunization Activity for additional information.     Screening Questionnaire for Adult Immunization    Are you sick today?   No   Do you have allergies to medications, food, a vaccine component or latex?   No   Have you ever had a serious reaction after receiving a vaccination?   No   Do you have a long-term health problem with heart, lung, kidney, or metabolic disease (e.g., diabetes), asthma, a blood disorder, no spleen, complement component deficiency, a cochlear implant, or a spinal fluid leak?  Are you on long-term aspirin therapy?   No   Do you have cancer, leukemia, HIV/AIDS, or any other immune system problem?   No   Do you have a parent, brother, or sister with an immune system problem?   No   In the past 3 months, have you taken medications that affect  your immune system, such as prednisone, other steroids, or anticancer drugs; drugs for the treatment of rheumatoid arthritis, Crohn s disease, or psoriasis; or have you had radiation treatments?   No   Have you had a seizure, or a brain or other nervous system problem?   No   During the past year, have you received a transfusion of blood or blood    products, or been given immune (gamma) globulin or antiviral drug?   No   For women: Are you pregnant or is there a chance you could become       pregnant during the next month?   No   Have you received any vaccinations in the past 4 weeks?   No     Immunization questionnaire answers were all negative.        Patient instructed to remain in clinic for 15 minutes afterwards, and to report any adverse reaction to me immediately.       Screening performed by Noam Root MA on 3/2/2023 at 1:28 PM.

## 2023-03-02 NOTE — PROGRESS NOTES
SUBJECTIVE:   CC: Rui is an 27 year old who presents for preventative health visit.     Patient has been advised of split billing requirements and indicates understanding: Yes  Healthy Habits:     Getting at least 3 servings of Calcium per day:  NO    Bi-annual eye exam:  NO    Dental care twice a year:  Yes    Sleep apnea or symptoms of sleep apnea:  None    Diet:  Regular (no restrictions) and Breakfast skipped    Frequency of exercise:  1 day/week    Duration of exercise:  15-30 minutes    Taking medications regularly:  Yes    Medication side effects:  Not applicable and None    PHQ-2 Total Score: 0    Additional concerns today:  Yes    Left dorsum hand -since last summer - size improving      Today's PHQ-2 Score:   PHQ-2 ( 1999 [employer]) 3/2/2023   Q1: Little interest or pleasure in doing things 0   Q2: Feeling down, depressed or hopeless 0   PHQ-2 Score 0   PHQ-2 Total Score (12-17 Years)- Positive if 3 or more points; Administer PHQ-A if positive -   Q1: Little interest or pleasure in doing things Not at all   Q2: Feeling down, depressed or hopeless Not at all   PHQ-2 Score 0           Social History     Tobacco Use     Smoking status: Never     Smokeless tobacco: Never   Substance Use Topics     Alcohol use: Yes     Alcohol/week: 1.0 standard drink     Types: 1 Cans of beer per week         Alcohol Use 3/2/2023   Prescreen: >3 drinks/day or >7 drinks/week? No   No flowsheet data found.      Last PSA: No results found for: PSA    Reviewed orders with patient. Reviewed health maintenance and updated orders accordingly - Yes  Labs reviewed in EPIC    Reviewed and updated as needed this visit by clinical staff   Tobacco  Allergies  Meds              Reviewed and updated as needed this visit by Provider                     Review of Systems   Constitutional: Negative for chills and fever.   HENT: Negative for congestion, ear pain, hearing loss and sore throat.    Eyes: Negative for pain and visual disturbance.  "  Respiratory: Negative for shortness of breath.    Cardiovascular: Negative for chest pain, palpitations and peripheral edema.   Gastrointestinal: Negative for abdominal pain, constipation, heartburn, hematochezia and nausea.   Genitourinary: Negative for dysuria, frequency, genital sores, hematuria, impotence, penile discharge and urgency.   Musculoskeletal: Negative for myalgias.   Skin: Negative for rash.   Neurological: Negative for dizziness, weakness, headaches and paresthesias.   Psychiatric/Behavioral: Negative for mood changes. The patient is not nervous/anxious.          OBJECTIVE:   /75 (BP Location: Left arm, Patient Position: Sitting, Cuff Size: Adult Regular)   Pulse 67   Temp 97.5  F (36.4  C) (Tympanic)   Resp 14   Ht 1.721 m (5' 7.75\")   Wt 70.7 kg (155 lb 12.8 oz)   SpO2 97%   BMI 23.86 kg/m      Physical Exam  GENERAL: healthy, alert and no distress  NECK: no adenopathy, no asymmetry, masses, or scars and thyroid normal to palpation  RESP: lungs clear to auscultation - no rales, rhonchi or wheezes  CV: regular rate and rhythm, normal S1 S2, no S3 or S4, no murmur, click or rub, no peripheral edema and peripheral pulses strong  ABDOMEN: soft, nontender, no hepatosplenomegaly, no masses and bowel sounds normal  MS: no gross musculoskeletal defects noted, no edema    Diagnostic Test Results:  Labs reviewed in Epic    ASSESSMENT/PLAN:   (Z00.00) Routine general medical examination at a health care facility  (primary encounter diagnosis)  -Vitals stable, depression screening normal.  Plan: CBC with platelets, Basic metabolic panel  (Ca,        Cl, CO2, Creat, Gluc, K, Na, BUN), Lipid panel         reflex to direct LDL Fasting      (R22.9) Localized superficial swelling, mass, or lump  -Localized nontender swelling dorsum of left hand.  -Duration: More than 8 months.  Size is improving as per Rui    -Ddx: Soft tissue swelling-probably ganglion cyst/lipoma.  -Likely benign.Rui can watch " for the next 3 to 5 months.  If he is very anxious, he can go ahead with getting ultrasound.    Plan: US Upper Extremity Non Vascular Left       (Z11.4) Screening for HIV (human immunodeficiency virus)    Plan: HIV Antigen Antibody Combo      (Z11.59) Need for hepatitis C screening test    Plan: Hepatitis C Screen Reflex to HCV RNA Quant and         Genotype         Patient has been advised of split billing requirements and indicates understanding: Yes      COUNSELING:   Reviewed preventive health counseling, as reflected in patient instructions       Regular exercise       Healthy diet/nutrition        He reports that he has never smoked. He has never used smokeless tobacco.            Nadege Bonilla MD  [facility]

## 2023-03-07 ENCOUNTER — LAB (OUTPATIENT)
Dept: LAB | Facility: CLINIC | Age: 28
End: 2023-03-07
Payer: COMMERCIAL

## 2023-03-07 DIAGNOSIS — Z11.4 SCREENING FOR HIV (HUMAN IMMUNODEFICIENCY VIRUS): ICD-10-CM

## 2023-03-07 DIAGNOSIS — Z11.59 NEED FOR HEPATITIS C SCREENING TEST: ICD-10-CM

## 2023-03-07 DIAGNOSIS — Z00.00 ROUTINE GENERAL MEDICAL EXAMINATION AT A HEALTH CARE FACILITY: ICD-10-CM

## 2023-03-07 LAB
ANION GAP SERPL CALCULATED.3IONS-SCNC: 5 MMOL/L (ref 3–14)
BUN SERPL-MCNC: 17 MG/DL (ref 7–30)
CALCIUM SERPL-MCNC: 9.3 MG/DL (ref 8.5–10.1)
CHLORIDE BLD-SCNC: 105 MMOL/L (ref 94–109)
CHOLEST SERPL-MCNC: 196 MG/DL
CO2 SERPL-SCNC: 29 MMOL/L (ref 20–32)
CREAT SERPL-MCNC: 1.16 MG/DL (ref 0.66–1.25)
ERYTHROCYTE [DISTWIDTH] IN BLOOD BY AUTOMATED COUNT: 11.5 % (ref 10–15)
FASTING STATUS PATIENT QL REPORTED: YES
GFR SERPL CREATININE-BSD FRML MDRD: 89 ML/MIN/1.73M2
GLUCOSE BLD-MCNC: 90 MG/DL (ref 70–99)
HCT VFR BLD AUTO: 45.3 % (ref 40–53)
HCV AB SERPL QL IA: NONREACTIVE
HDLC SERPL-MCNC: 47 MG/DL
HGB BLD-MCNC: 15.3 G/DL (ref 13.3–17.7)
HIV 1+2 AB+HIV1 P24 AG SERPL QL IA: NONREACTIVE
LDLC SERPL CALC-MCNC: 123 MG/DL
MCH RBC QN AUTO: 28.5 PG (ref 26.5–33)
MCHC RBC AUTO-ENTMCNC: 33.8 G/DL (ref 31.5–36.5)
MCV RBC AUTO: 85 FL (ref 78–100)
NONHDLC SERPL-MCNC: 149 MG/DL
PLATELET # BLD AUTO: 276 10E3/UL (ref 150–450)
POTASSIUM BLD-SCNC: 3.8 MMOL/L (ref 3.4–5.3)
RBC # BLD AUTO: 5.36 10E6/UL (ref 4.4–5.9)
SODIUM SERPL-SCNC: 139 MMOL/L (ref 133–144)
TRIGL SERPL-MCNC: 128 MG/DL
WBC # BLD AUTO: 8.3 10E3/UL (ref 4–11)

## 2023-03-07 PROCEDURE — 36415 COLL VENOUS BLD VENIPUNCTURE: CPT

## 2023-03-07 PROCEDURE — 80048 BASIC METABOLIC PNL TOTAL CA: CPT

## 2023-03-07 PROCEDURE — 86803 HEPATITIS C AB TEST: CPT

## 2023-03-07 PROCEDURE — 85027 COMPLETE CBC AUTOMATED: CPT

## 2023-03-07 PROCEDURE — 80061 LIPID PANEL: CPT

## 2023-03-07 PROCEDURE — 87389 HIV-1 AG W/HIV-1&-2 AB AG IA: CPT

## 2023-09-27 ENCOUNTER — OFFICE VISIT (OUTPATIENT)
Dept: URGENT CARE | Facility: URGENT CARE | Age: 28
End: 2023-09-27
Payer: COMMERCIAL

## 2023-09-27 VITALS
TEMPERATURE: 97.6 F | HEART RATE: 71 BPM | BODY MASS INDEX: 22.67 KG/M2 | WEIGHT: 148 LBS | DIASTOLIC BLOOD PRESSURE: 76 MMHG | SYSTOLIC BLOOD PRESSURE: 116 MMHG | OXYGEN SATURATION: 100 % | RESPIRATION RATE: 17 BRPM

## 2023-09-27 DIAGNOSIS — R10.10 UPPER ABDOMINAL PAIN: Primary | ICD-10-CM

## 2023-09-27 PROCEDURE — 99214 OFFICE O/P EST MOD 30 MIN: CPT | Performed by: NURSE PRACTITIONER

## 2023-09-27 RX ORDER — CALCIUM CARBONATE 500 MG/1
1 TABLET, CHEWABLE ORAL 2 TIMES DAILY
COMMUNITY
End: 2024-10-01

## 2023-09-27 RX ORDER — SUCRALFATE 1 G/1
1 TABLET ORAL 4 TIMES DAILY
Qty: 40 TABLET | Refills: 0 | Status: SHIPPED | OUTPATIENT
Start: 2023-09-27 | End: 2023-10-07

## 2023-09-27 RX ORDER — ACETAMINOPHEN 500 MG
500-1000 TABLET ORAL EVERY 6 HOURS PRN
COMMUNITY
End: 2024-10-01

## 2023-09-27 NOTE — PROGRESS NOTES
SUBJECTIVE  HPI:  Chintan Harley is a 28 year old male who presents with the CC of abdominal/pelvic pain.  Pain started Friday. Has been trying new diet with no sugars, carbs, alcohol, beef, caffeine.   Pain is located in the epigastric area, with radiation to None.  The pain is characterized as burning, and at worst is a level 4 on a scale of 1-10.  Pain has been present for 5 day(s) and is stable.  EXACERBATING FACTORS: eating  RELIEVING FACTORS: nothing.  ASSOCIATED SX: diarrhea and bloating.  TUMS were not helpful.    No past medical history on file.  Current Outpatient Medications   Medication Sig Dispense Refill    acetaminophen (TYLENOL) 500 MG tablet Take 500-1,000 mg by mouth every 6 hours as needed for mild pain      calcium carbonate (TUMS) 500 MG chewable tablet Take 1 chew tab by mouth 2 times daily      calcipotriene (DOVONOX) 0.005 % external solution Apply 10-15 drops once daily at nighttime before bed (Patient not taking: Reported on 9/27/2023) 60 mL 11    ketoconazole (NIZORAL) 2 % external shampoo Lather onto scalp in shower at least three times weekly. Let sit for 2 minutes before rinsing. (Patient not taking: Reported on 9/27/2023) 120 mL 11     Social History     Tobacco Use    Smoking status: Never    Smokeless tobacco: Never   Substance Use Topics    Alcohol use: Yes     Alcohol/week: 1.0 standard drink of alcohol     Types: 1 Cans of beer per week       OBJECTIVE:  /76   Pulse 71   Temp 97.6  F (36.4  C) (Tympanic)   Resp 17   Wt 67.1 kg (148 lb)   SpO2 100%   BMI 22.67 kg/m    GENERAL APPEARANCE: healthy, alert and no distress  EYES: EOMI,  PERRL, conjunctiva clear  HENT: ear canals and TM's normal.  Nose and mouth without ulcers, erythema or lesions  NECK: supple, nontender, no lymphadenopathy  RESP: lungs clear to auscultation - no rales, rhonchi or wheezes  CV: regular rates and rhythm, normal S1 S2, no murmur noted  ABDOMEN:  soft, nontender, no HSM or masses and  bowel sounds normal  NEURO: Normal strength and tone, sensory exam grossly normal,  normal speech and mentation  SKIN: no suspicious lesions or rashes    ASSESSMENT:  1. Upper abdominal pain    - Adult GI  Referral - Consult Only; Future  - sucralfate (CARAFATE) 1 GM tablet; Take 1 tablet (1 g) by mouth 4 times daily for 10 days  Dispense: 40 tablet; Refill: 0  - omeprazole (PRILOSEC) 20 MG DR capsule; Take 1 capsule (20 mg) by mouth daily for 30 days  Dispense: 30 capsule; Refill: 0    PLAN:   1. Take Tylenol as needed for pain control.  2. Start a clear liquid diet for the next 48 hours, advance your diet as tolerated. Start advancing this thick liquids then soft solids.   3. Follow up  if pain is not improving over the next 5 days. Seek emergency medical attention if you are developing fevers, more severe abdominal pain, or vomiting.   4.ref for GI placed today.

## 2023-09-27 NOTE — PATIENT INSTRUCTIONS
1. Take Tylenol as needed for pain control.  2. Start a clear liquid diet for the next 48 hours, advance your diet as tolerated. Start advancing this thick liquids then soft solids.   3. Follow up  if pain is not improving over the next 5 days. Seek emergency medical attention if you are developing fevers, more severe abdominal pain, or vomiting.   4.ref for GI placed today.

## 2023-10-03 ENCOUNTER — OFFICE VISIT (OUTPATIENT)
Dept: GASTROENTEROLOGY | Facility: CLINIC | Age: 28
End: 2023-10-03
Attending: NURSE PRACTITIONER
Payer: COMMERCIAL

## 2023-10-03 VITALS
BODY MASS INDEX: 21.98 KG/M2 | SYSTOLIC BLOOD PRESSURE: 108 MMHG | HEIGHT: 68 IN | DIASTOLIC BLOOD PRESSURE: 74 MMHG | WEIGHT: 145 LBS | OXYGEN SATURATION: 99 % | HEART RATE: 79 BPM

## 2023-10-03 DIAGNOSIS — R10.10 UPPER ABDOMINAL PAIN: ICD-10-CM

## 2023-10-03 DIAGNOSIS — R19.5 LOOSE STOOLS: Primary | ICD-10-CM

## 2023-10-03 PROCEDURE — 99203 OFFICE O/P NEW LOW 30 MIN: CPT | Performed by: PHYSICIAN ASSISTANT

## 2023-10-03 ASSESSMENT — PAIN SCALES - GENERAL: PAINLEVEL: MILD PAIN (2)

## 2023-10-03 NOTE — PROGRESS NOTES
GI CLINIC VISIT    CC/REFERRING MD:  Bridget Pollard  REASON FOR CONSULTATION: Epigastric Pain    ASSESSMENT/PLAN:    29 y/o  presents for evaluation of reflux symptoms.     #epigastric pain  #Soft stools  Patient developed upper abdominal pain about 10 days ago, after possibly eating a moldy bagel. He was evaluated at  about 5 days ago and was started on omeprazole 20mg daily and carafate with meals -- he has noticed improvement with this. Symptoms may be secondary to gastritis, PUD, GERD, also considering biliary etiology. Will plan to obtain H.Pylori testing, but did notify patient that he will need to stop omeprazole for two weeks prior to submitting the sample. Once he has submitted the sample, he can start taking omeprazole 40mg daily. In the mean time he will take carafate and pepcid PRN. He does endorse loose stools that occurred for a week, but now they are more so described as a bristol type VII. Prior to his abdominal pain starting stools were described as a bristol type III. Discussed with patient that he should start a fiber supplementation, if soft stools are persistent would plan to obtain further labs and stool studies.   --submit H.Pylori testing  --once testing is submitted, start omeprazole 40mg daily.  --can take carafate and pepcid PRN  --start a soluble fiber.  --future considerations: if persistent soft stools, would obtain labs and stool studies. In regards to epigastric pain, could consider EGD if no improvement in epigastric pain.        RTC 3 months    Thank you for this consultation.  It was a pleasure to participate in the care of this patient; please contact us with any further questions.       31 minutes spent on the date of the encounter doing chart review, review of outside records, patient visit, and documentation    This note was created with voice recognition software, and while reviewed for accuracy, typos may remain.    Flavia Martínez PA-C  Division of Gastroenterology, Hepatology  and Nutrition  [facility]      HPI  29 y/o  presents for evaluation of reflux symptoms.    Patient reports he developed a burning sensation in his upper abdomen which began about 10 days ago. He was seen at  about 5 days ago, and was started on omeprazole 20mg daily, and carafate (which he takes 1 hour before lunch, then 3 hours after lunch, and then takes it at night) and has noticed improvement in symptoms. He notes that pain can be present constantly, does not feel that symptoms are not worse with eating. He notes that initially symptoms began after eating a bagel (which he wonders may have been moldy). Denies nausea or vomiting.  In terms of improvement, he notes pain is no longer a burning sensation, which he describes as a cramp, which is generally present constantly. No hx of gallbladder issues.     In regards to his bowel movements, he will generally have a bowel movement 1-2x/day, described as a bristol type III, denies pushing/straining, denies BRBPR. Since abdominal pain started, patient was having liquid stools, usually 3x/d. Since the UC visit, he has only had one liquid stool, at times can be soft, other times can be solid. Patient started a liquid diet for two days after visiting in .     In the beginning of September, patient tried to cut out gluten, no caffeine, no sugar, cut out beef, and pork as well.     Denies NSAIDS. He takes tylenol 500mg BID for his abdominal pain, he started this 5 days ago. Denies use of OTC herbal supplements/weight loss products.      Denies use of ETOH (he has not drank alcohol in the last month, he was binge drinking on the weekends - did this for a couple of months) and tobacco products. No recreational drug use.     Uncle may have passed away from colon cancer.  Denies family history of IBD/celiac disease.     ROS:    No fevers or chills  No weight loss  No shortness of breath or wheezing  No chest pain or  pressure  No odynophagia or dysphagia  No BRBPR, hematochezia, melena  No anxiety or depression    PROBLEM LIST  There are no problems to display for this patient.      PERTINENT PAST MEDICAL HISTORY:    No past medical history on file.    PREVIOUS SURGERIES:    No past surgical history on file.    PREVIOUS ENDOSCOPY:      ALLERGIES:   No Known Allergies    PERTINENT MEDICATIONS:    Current Outpatient Medications:     acetaminophen (TYLENOL) 500 MG tablet, Take 500-1,000 mg by mouth every 6 hours as needed for mild pain, Disp: , Rfl:     calcipotriene (DOVONOX) 0.005 % external solution, Apply 10-15 drops once daily at nighttime before bed (Patient not taking: Reported on 9/27/2023), Disp: 60 mL, Rfl: 11    calcium carbonate (TUMS) 500 MG chewable tablet, Take 1 chew tab by mouth 2 times daily, Disp: , Rfl:     ketoconazole (NIZORAL) 2 % external shampoo, Lather onto scalp in shower at least three times weekly. Let sit for 2 minutes before rinsing. (Patient not taking: Reported on 9/27/2023), Disp: 120 mL, Rfl: 11    omeprazole (PRILOSEC) 20 MG DR capsule, Take 1 capsule (20 mg) by mouth daily for 30 days, Disp: 30 capsule, Rfl: 0    sucralfate (CARAFATE) 1 GM tablet, Take 1 tablet (1 g) by mouth 4 times daily for 10 days, Disp: 40 tablet, Rfl: 0    SOCIAL HISTORY:    Social History     Socioeconomic History    Marital status:      Spouse name: Not on file    Number of children: Not on file    Years of education: Not on file    Highest education level: Not on file   Occupational History    Not on file   Tobacco Use    Smoking status: Never    Smokeless tobacco: Never   Substance and Sexual Activity    Alcohol use: Yes     Alcohol/week: 1.0 standard drink of alcohol     Types: 1 Cans of beer per week    Drug use: No    Sexual activity: Never   Other Topics Concern    Not on file   Social History Narrative    Not on file     Social Determinants of Health     Financial Resource Strain: Not on file   Food  "Insecurity: Not on file   Transportation Needs: Not on file   Physical Activity: Not on file   Stress: Not on file   Social Connections: Not on file   Interpersonal Safety: Not on file   Housing Stability: Not on file       FAMILY HISTORY:  FH of CRC: Uncle had colon cancer  FH of IBD: None.  Family History   Problem Relation Age of Onset    Diabetes Maternal Grandmother     Asthma No family hx of     C.A.D. No family hx of     Cardiovascular No family hx of     Hypertension No family hx of     Cerebrovascular Disease No family hx of     Breast Cancer No family hx of     Cancer - colorectal No family hx of     Prostate Cancer No family hx of        Past/family/social history reviewed and no changes    PHYSICAL EXAMINATION:  Constitutional: aaox3, cooperative, pleasant, not dyspneic/diaphoretic, no acute distress  Vitals reviewed: /74 (BP Location: Left arm, Patient Position: Sitting, Cuff Size: Adult Regular)   Pulse 79   Ht 1.721 m (5' 7.75\")   Wt 65.8 kg (145 lb)   SpO2 99%   BMI 22.21 kg/m    Wt:   Wt Readings from Last 2 Encounters:   09/27/23 67.1 kg (148 lb)   03/02/23 70.7 kg (155 lb 12.8 oz)      Eyes: Sclera anicteric/injected  Respiratory: Unlabored breathing  Skin: warm, perfused, no jaundice  Psych: Normal affect  MSK: Normal gait      "

## 2023-10-03 NOTE — PATIENT INSTRUCTIONS
It was a pleasure taking care of you today.  I've included a brief summary of our discussion and care plan from today's visit below.  Please review this information with your primary care provider.  ______________________________________________________________________    My recommendations are summarized as follows:  --please submit stool study for H.Pylori testing, you will have to be off of omeprazole for TWO Weeks prior to submitting stool sample, one you've submitted the sample, you can start taking omeprazole again, but would recommend starting at 40mg daily, 30-60 minutes before your first meal in the morning.  --you can take sucralfate with meals, and pepcid 20mg two times a day as needed.    Lifestyle modifications for gastroesophageal reflux disease (GERD).     1. Change your eating habits.  -- It's best to eat several small meals instead of two or three large meals.  -- After you eat, wait 2 to 3 hours before you lie down. Late-night snacks aren't a good idea.  -- Chocolate, mint, and alcohol can make GERD worse. They relax the valve between the esophagus and the stomach.  -- Spicy foods, fatty foods, foods that have a lot of acid (like tomatoes and oranges), and coffee can make GERD symptoms worse in some people. If your symptoms are worse after you eat a certain food, you may want to stop eating that food to see if your symptoms get better.    2. Do not smoke or chew tobacco.    3. If you have GERD symptoms at night, raise the head of your bed 6 in. (15 cm) to 8 in. (20 cm) by putting the frame on blocks or placing a foam wedge under the head of your mattress. (Adding extra pillows does not work.)    4. Avoid or reduce pressure on your stomach. Don't wear tight clothing around your middle.    5. Lose weight if you need to. Losing just 5 to 10 pounds can help.    ---- Recommend starting supplementation with a powdered soluble fiber. When used on a daily basis, this can help regulate the consistency of  your stools. Metamucil (psyllium) and Citrucel are preferred examples. You can start with 1-2 teaspoons per day, with goal to gradually increase to 1 tablespoon daily. You can increase up to 1 tablespoon three times daily if needed. It is important to stay well-hydrated with use of fiber supplementation and to make sure that the fiber powder is well mixed with water as directed on the label. You may experience some bloating with initiation of fiber, which will improve over the first few weeks. A good trial to evaluate the effect is 3-6 months. Of note, many of the fiber products contain artificial sweeteners, which can cause bloating, gas, and diarrhea in those who may be sensitive to artificial sweeteners. If this is the case, recommend trying Metamucil Premium Blend (with Stevia), Metamucil 4-in-1 without Added Sweeteners, or Bellway (sweetened with Monk fruit extract).    --please notify me, if you continue to have loose stools for the next two weeks.    -- please see scheduling information provided below     Return to GI Clinic in 3 months to review your progress.    ______________________________________________________________________    How do I schedule labs, imaging studies, or procedures that were ordered in clinic today?     Labs: To schedule lab appointment at the [facility], use my chart or call [phone]. If you have a [facility] lab closer to home where you are regularly seen you can give them a call.     Procedures: If a colonoscopy, upper endoscopy, breath test, esophageal manometry, or pH impedence was ordered today, our endoscopy team will call you to schedule this. If you have not heard from our endoscopy team within a week, please call [phone] to schedule.     Imaging Studies: If you were scheduled for a CT scan, X-ray, MRI, ultrasound, HIDA scan or other imaging study, please call [phone] to have this scheduled.     Referral: If a  referral to another specialty was ordered, expect a phone call or follow instructions above. If you have not heard from anyone regarding your referral in a week, please call our clinic to check the status.     Who do I call with any questions after my visit?  Please be in touch if there are any further questions that arise following today's visit.  There are multiple ways to contact your gastroenterology care team.      During business hours, you may reach a Gastroenterology nurse at [phone]    To schedule or reschedule an appointment, please call [phone].     You can always send a secure message through [employer].  [employer] messages are answered by your nurse or doctor typically within 24 hours.  Please allow extra time on weekends and holidays.      For urgent/emergent questions after business hours, you may reach the on-call GI Fellow by contacting the [facility]  at [phone].     How will I get the results of any tests ordered?    You will receive all of your results.  If you have signed up for [facility], any tests ordered at your visit will be available to you after your provider reviews them.  Typically this takes 1-2 weeks.  If there are urgent results that require a change in your care plan, your provider or nurse will call you to discuss the next steps.      What is [employer]?  [employer] is a secure way for you to access all of your healthcare records from the [facility].  It is a web based computer program, so you can sign on to it from any location.  It also allows you to send secure messages to your care team.  I recommend signing up for [employer] access if you have not already done so and are comfortable with using a computer.      How to I schedule a follow-up visit?  If you did not schedule a follow-up visit today, please call [phone] to schedule a follow-up office visit.      Sincerely,    Flavia Martínez PA-C  Division of Gastroenterology, Hepatology &  [facility]

## 2023-11-02 ENCOUNTER — OFFICE VISIT (OUTPATIENT)
Dept: URGENT CARE | Facility: URGENT CARE | Age: 28
End: 2023-11-02
Payer: COMMERCIAL

## 2023-11-02 VITALS
SYSTOLIC BLOOD PRESSURE: 108 MMHG | TEMPERATURE: 99 F | HEART RATE: 85 BPM | OXYGEN SATURATION: 98 % | WEIGHT: 140.7 LBS | BODY MASS INDEX: 21.55 KG/M2 | DIASTOLIC BLOOD PRESSURE: 73 MMHG

## 2023-11-02 DIAGNOSIS — J03.90 TONSILLITIS: Primary | ICD-10-CM

## 2023-11-02 DIAGNOSIS — J20.9 ACUTE BRONCHITIS, UNSPECIFIED ORGANISM: ICD-10-CM

## 2023-11-02 LAB
DEPRECATED S PYO AG THROAT QL EIA: NEGATIVE
GROUP A STREP BY PCR: NOT DETECTED

## 2023-11-02 PROCEDURE — 99213 OFFICE O/P EST LOW 20 MIN: CPT | Performed by: PHYSICIAN ASSISTANT

## 2023-11-02 PROCEDURE — 87651 STREP A DNA AMP PROBE: CPT | Performed by: PHYSICIAN ASSISTANT

## 2023-11-02 RX ORDER — AZITHROMYCIN 250 MG/1
TABLET, FILM COATED ORAL
Qty: 6 TABLET | Refills: 0 | Status: SHIPPED | OUTPATIENT
Start: 2023-11-02 | End: 2024-10-01

## 2023-11-02 RX ORDER — BENZONATATE 200 MG/1
200 CAPSULE ORAL 3 TIMES DAILY PRN
Qty: 30 CAPSULE | Refills: 0 | Status: SHIPPED | OUTPATIENT
Start: 2023-11-02 | End: 2023-11-12

## 2023-11-02 RX ORDER — LIDOCAINE HYDROCHLORIDE 20 MG/ML
15 SOLUTION OROPHARYNGEAL
Qty: 100 ML | Refills: 0 | Status: SHIPPED | OUTPATIENT
Start: 2023-11-02 | End: 2024-10-01

## 2023-11-02 ASSESSMENT — ENCOUNTER SYMPTOMS
SORE THROAT: 1
SINUS PRESSURE: 0
SINUS PAIN: 0
WHEEZING: 0
CHEST TIGHTNESS: 0
FEVER: 0
PALPITATIONS: 0
CARDIOVASCULAR NEGATIVE: 1
CHILLS: 0
RHINORRHEA: 0
FATIGUE: 0
SHORTNESS OF BREATH: 0
GASTROINTESTINAL NEGATIVE: 1
COUGH: 1

## 2023-11-02 NOTE — PROGRESS NOTES
Michelle Fabian is a 28 year old, presenting for the following health issues:  Pharyngitis (Noticed it last night unable to sleep choking on mucus - strep) and Cough (Productive cough since yesterday )    HPI   Acute Illness  Acute illness concerns:   Onset/Duration: 2days  Symptoms:  Fever: No  Chills/Sweats: No  Headache (location?): No  Sinus Pressure: No  Conjunctivitis:  No  Ear Pain: no  Rhinorrhea: No  Congestion: No  Sore Throat: YES  Cough: YES-productive of yellow sputum, possibly blood too  Wheeze: Yes  Decreased Appetite: No  Nausea: No  Vomiting: No  Diarrhea: No  Dysuria/Freq.: No  Dysuria or Hematuria: No  Fatigue/Achiness: No  Sick/Strep Exposure: YES  Therapies tried and outcome: rest,fluids with minimal relief    There is no problem list on file for this patient.    Current Outpatient Medications   Medication    acetaminophen (TYLENOL) 500 MG tablet    calcipotriene (DOVONOX) 0.005 % external solution    calcium carbonate (TUMS) 500 MG chewable tablet    ketoconazole (NIZORAL) 2 % external shampoo     No current facility-administered medications for this visit.      No Known Allergies    Review of Systems   Constitutional:  Negative for chills, fatigue and fever.   HENT:  Positive for sore throat. Negative for congestion, ear discharge, ear pain, hearing loss, rhinorrhea, sinus pressure and sinus pain.    Respiratory:  Positive for cough. Negative for chest tightness, shortness of breath and wheezing.    Cardiovascular: Negative.  Negative for chest pain, palpitations and peripheral edema.   Gastrointestinal: Negative.    All other systems reviewed and are negative.           Objective    /73   Pulse 85   Temp 99  F (37.2  C) (Tympanic)   Wt 63.8 kg (140 lb 11.2 oz)   SpO2 98%   BMI 21.55 kg/m    Body mass index is 21.55 kg/m .  Physical Exam  Vitals and nursing note reviewed.   Constitutional:       General: He is not in acute distress.     Appearance: Normal appearance. He is  normal weight. He is not ill-appearing.   HENT:      Head: Normocephalic and atraumatic.      Ears:      Comments: TMs are intact without any erythema or bulging bilaterally.  Airway is patent.     Nose: Nose normal.      Mouth/Throat:      Lips: Pink.      Mouth: Mucous membranes are moist.      Pharynx: Uvula midline. Pharyngeal swelling and posterior oropharyngeal erythema present. No oropharyngeal exudate or uvula swelling.      Tonsils: No tonsillar exudate or tonsillar abscesses. 1+ on the right. 1+ on the left.   Eyes:      General: No scleral icterus.     Extraocular Movements: Extraocular movements intact.      Conjunctiva/sclera: Conjunctivae normal.      Pupils: Pupils are equal, round, and reactive to light.   Neck:      Thyroid: No thyromegaly.   Cardiovascular:      Rate and Rhythm: Normal rate and regular rhythm.      Pulses: Normal pulses.      Heart sounds: Normal heart sounds, S1 normal and S2 normal. No murmur heard.     No friction rub. No gallop.   Pulmonary:      Effort: Pulmonary effort is normal. No tachypnea, accessory muscle usage, respiratory distress or retractions.      Breath sounds: Normal breath sounds and air entry. No stridor. No decreased breath sounds, wheezing, rhonchi or rales.   Musculoskeletal:      Cervical back: Normal range of motion and neck supple.   Lymphadenopathy:      Head:      Right side of head: Tonsillar adenopathy present.      Left side of head: Tonsillar adenopathy present.      Cervical: No cervical adenopathy.   Skin:     General: Skin is warm and dry.      Findings: No rash.   Neurological:      Mental Status: He is alert and oriented to person, place, and time.   Psychiatric:         Mood and Affect: Mood normal.         Behavior: Behavior normal.         Thought Content: Thought content normal.         Judgment: Judgment normal.       Results for orders placed or performed in visit on 11/02/23 (from the past 24 hour(s))   Streptococcus A Rapid Screen  w/Reflex to PCR - Clinic Collect    Specimen: Throat; Swab   Result Value Ref Range    Group A Strep antigen Negative Negative         Assessment/Plan:  Tonsillitis:  RST is negative, will send for strep PCR.  Will treat for tonsillitis with [id number] based on H&P and give lidocaine oral viscous as needed for sore throat.  Recommend tylenol/ibuprofen prn pain/fever, warm salt water gargles, lozenges or cough drops.  Recheck in clinic if symptoms worsen or if symptoms do not improve.    -     Streptococcus A Rapid Screen w/Reflex to PCR - Clinic Collect  -     Group A Streptococcus PCR Throat Swab  -     azithromycin (ZITHROMAX) 250 MG tablet; 2 tablets the first day, then 1 tablet daily for the next 4 days  -     lidocaine, viscous, (XYLOCAINE) 2 % solution; Swish and spit 15 mLs in mouth every 3 hours as needed for moderate pain ; Max 8 doses/24 hour period.    Acute bronchitis, unspecified organism:  Will cover with zithromax and give tessalon perles as needed for cough.  -     azithromycin (ZITHROMAX) 250 MG tablet; 2 tablets the first day, then 1 tablet daily for the next 4 days  -     benzonatate (TESSALON) 200 MG capsule; Take 1 capsule (200 mg) by mouth 3 times daily as needed for cough        Rosario Oconnor PA-C

## 2024-04-21 ENCOUNTER — HEALTH MAINTENANCE LETTER (OUTPATIENT)
Age: 29
End: 2024-04-21

## 2024-09-30 SDOH — HEALTH STABILITY: PHYSICAL HEALTH: ON AVERAGE, HOW MANY DAYS PER WEEK DO YOU ENGAGE IN MODERATE TO STRENUOUS EXERCISE (LIKE A BRISK WALK)?: 0 DAYS

## 2024-09-30 SDOH — HEALTH STABILITY: PHYSICAL HEALTH: ON AVERAGE, HOW MANY MINUTES DO YOU ENGAGE IN EXERCISE AT THIS LEVEL?: 0 MIN

## 2024-09-30 ASSESSMENT — SOCIAL DETERMINANTS OF HEALTH (SDOH): HOW OFTEN DO YOU GET TOGETHER WITH FRIENDS OR RELATIVES?: TWICE A WEEK

## 2024-10-01 ENCOUNTER — OFFICE VISIT (OUTPATIENT)
Dept: FAMILY MEDICINE | Facility: CLINIC | Age: 29
End: 2024-10-01
Payer: COMMERCIAL

## 2024-10-01 VITALS
DIASTOLIC BLOOD PRESSURE: 78 MMHG | SYSTOLIC BLOOD PRESSURE: 117 MMHG | RESPIRATION RATE: 16 BRPM | BODY MASS INDEX: 21.73 KG/M2 | HEIGHT: 68 IN | HEART RATE: 78 BPM | OXYGEN SATURATION: 100 % | WEIGHT: 143.4 LBS | TEMPERATURE: 98.3 F

## 2024-10-01 DIAGNOSIS — Z00.00 ROUTINE GENERAL MEDICAL EXAMINATION AT A HEALTH CARE FACILITY: Primary | ICD-10-CM

## 2024-10-01 DIAGNOSIS — Z23 ENCOUNTER FOR IMMUNIZATION: ICD-10-CM

## 2024-10-01 PROCEDURE — 99395 PREV VISIT EST AGE 18-39: CPT | Mod: 25 | Performed by: FAMILY MEDICINE

## 2024-10-01 PROCEDURE — 90471 IMMUNIZATION ADMIN: CPT | Performed by: FAMILY MEDICINE

## 2024-10-01 PROCEDURE — 90480 ADMN SARSCOV2 VAC 1/ONLY CMP: CPT | Performed by: FAMILY MEDICINE

## 2024-10-01 PROCEDURE — 91320 SARSCV2 VAC 30MCG TRS-SUC IM: CPT | Performed by: FAMILY MEDICINE

## 2024-10-01 PROCEDURE — 90656 IIV3 VACC NO PRSV 0.5 ML IM: CPT | Performed by: FAMILY MEDICINE

## 2024-10-01 ASSESSMENT — PAIN SCALES - GENERAL: PAINLEVEL: NO PAIN (0)

## 2024-10-01 NOTE — PATIENT INSTRUCTIONS
At [facility], we strive to deliver an exceptional experience to you, every time we see you. If you receive a survey, please let us know what we are doing well and/or what we could improve upon, as we do value your feedback.  If you have MyChart, you can expect to receive results automatically within 24 hours of their completion.  Your provider will send a note interpreting your results as well.   If you do not have MyChart, you should receive your results in about a week by mail.    Your care team:                            Family Medicine Internal Medicine   MD Silvestre Pickard, MD Monica Mcdaniels, MD Fabio Kapoor, MD Adelaida Qureshi, PALornaC    El Ca, MD Pediatrics   Belkys Renae, MD Katy Cole, MD Daisy Camp, APRN CNP Xiomara Barr APRN CNP   MD Viola Maria, MD Yelitza Gonzalez, CNP     Rk Muñoz, CNP Same-Day Provider (No follow-up visits)   ZINA Ribera, DNP Tina Carey, ZINA Gonzalez, FNP, BC JAMILA ColemanC     Clinic hours: Monday - Thursday 7 am-6 pm; Fridays 7 am-5 pm.   Urgent care: Monday - Friday 10 am- 8 pm; Saturday and Sunday 9 am-5 pm.    Clinic: [phone]       [facility]: Monday - Thursday 8 am - 7 pm; Friday 8 am - 6 pm  [facility]: [phone]

## 2024-10-01 NOTE — PROGRESS NOTES
Preventive Care Visit  [facility]  Nadege Bonilla MD, Family Medicine  Oct 1, 2024      Assessment & Plan     Routine general medical examination at a health care facility  -Doing well overall.    - CBC with platelets; Future  - Lipid panel reflex to direct LDL Fasting; Future  - Comprehensive metabolic panel (BMP + Alb, Alk Phos, ALT, AST, Total. Bili, TP); Future    Encounter for immunization  -Received influenza and COVID-vaccine today.    Patient has been advised of split billing requirements and indicates understanding: Yes        Counseling  Appropriate preventive services were addressed with this patient via screening, questionnaire, or discussion as appropriate for fall prevention, nutrition, physical activity, Tobacco-use cessation, social engagement, weight loss and cognition.  Checklist reviewing preventive services available has been given to the patient.  Reviewed patient's diet, addressing concerns and/or questions.   He is at risk for psychosocial distress and has been provided with information to reduce risk.           Michelle Fabian is a 29 year old, presenting for the following:  Physical        10/1/2024    11:00 AM   Additional Questions   Roomed by KIMMY   Accompanied by self        Health Care Directive  Patient does not have a Health Care Directive or Living Will:     HPI              9/30/2024   General Health   How would you rate your overall physical health? (!) FAIR   Feel stress (tense, anxious, or unable to sleep) Only a little      (!) STRESS CONCERN      9/30/2024   Nutrition   Three or more servings of calcium each day? Yes   Diet: Low salt    Low fat/cholesterol    Gluten-free/reduced   How many servings of fruit and vegetables per day? (!) 2-3   How many sweetened beverages each day? 0-1       Multiple values from one day are sorted in reverse-chronological order         9/30/2024   Exercise   Days per week of moderate/strenous  exercise 0 days   Average minutes spent exercising at this level 0 min      (!) EXERCISE CONCERN      9/30/2024   Social Factors   Frequency of gathering with friends or relatives Twice a week   Worry food won't last until get money to buy more No   Food not last or not have enough money for food? No   Do you have housing? (Housing is defined as stable permanent housing and does not include staying ouside in a car, in a tent, in an abandoned building, in an overnight shelter, or couch-surfing.) No   Are you worried about losing your housing? No   Lack of transportation? No   Unable to get utilities (heat,electricity)? No   Want help with housing or utility concern? No      (!) HOUSING CONCERN PRESENT      9/30/2024   Dental   Dentist two times every year? Yes            9/30/2024   TB Screening   Were you born outside of the [place]? No            Today's PHQ-2 Score:       9/30/2024    10:22 AM   PHQ-2 ( 1999 [employer])   Q1: Little interest or pleasure in doing things 0   Q2: Feeling down, depressed or hopeless 0   PHQ-2 Score 0   Q1: Little interest or pleasure in doing things Not at all   Q2: Feeling down, depressed or hopeless Not at all   PHQ-2 Score 0           9/30/2024   Substance Use   Alcohol more than 3/day or more than 7/wk No   Do you use any other substances recreationally? No        Social History     Tobacco Use    Smoking status: Never    Smokeless tobacco: Never   Substance Use Topics    Alcohol use: Yes     Alcohol/week: 1.0 standard drink of alcohol     Types: 1 Cans of beer per week    Drug use: No           9/30/2024   STI Screening   New sexual partner(s) since last STI/HIV test? No           Reviewed and updated as needed this visit by Provider                          Review of Systems  Constitutional, HEENT, cardiovascular, pulmonary, gi and gu systems are negative, except as otherwise noted.     Objective    Exam  /78 (BP Location: Left arm, Patient Position: Sitting, Cuff Size: Adult  "Regular)   Pulse 78   Temp 98.3  F (36.8  C) (Temporal)   Resp 16   Ht 1.72 m (5' 7.72\")   Wt 65 kg (143 lb 6.4 oz)   SpO2 100%   BMI 21.99 kg/m     Estimated body mass index is 21.99 kg/m  as calculated from the following:    Height as of this encounter: 1.72 m (5' 7.72\").    Weight as of this encounter: 65 kg (143 lb 6.4 oz).    Physical Exam  GENERAL: alert and no distress  NECK: no adenopathy, no asymmetry, masses, or scars  RESP: lungs clear to auscultation - no rales, rhonchi or wheezes  CV: regular rate and rhythm, normal S1 S2, no S3 or S4, no murmur, click or rub, no peripheral edema  ABDOMEN: soft, nontender, no hepatosplenomegaly, no masses and bowel sounds normal  MS: no gross musculoskeletal defects noted, no edema        Signed Electronically by: Nadege Bonilla MD    "

## 2025-09-01 ENCOUNTER — PATIENT OUTREACH (OUTPATIENT)
Dept: CARE COORDINATION | Facility: CLINIC | Age: 30
End: 2025-09-01
Payer: COMMERCIAL